# Patient Record
Sex: MALE | Race: WHITE | NOT HISPANIC OR LATINO | Employment: FULL TIME | URBAN - METROPOLITAN AREA
[De-identification: names, ages, dates, MRNs, and addresses within clinical notes are randomized per-mention and may not be internally consistent; named-entity substitution may affect disease eponyms.]

---

## 2017-01-03 ENCOUNTER — HOSPITAL ENCOUNTER (EMERGENCY)
Facility: CLINIC | Age: 42
Discharge: HOME | End: 2017-01-04
Attending: EMERGENCY MEDICINE | Admitting: EMERGENCY MEDICINE
Payer: COMMERCIAL

## 2017-01-03 ENCOUNTER — APPOINTMENT (OUTPATIENT)
Dept: CT IMAGING | Facility: CLINIC | Age: 42
End: 2017-01-03
Attending: EMERGENCY MEDICINE
Payer: COMMERCIAL

## 2017-01-03 DIAGNOSIS — R56.9 CONVULSIONS, UNSPECIFIED CONVULSION TYPE (H): ICD-10-CM

## 2017-01-03 LAB
BASOPHILS # BLD AUTO: 0 10E9/L (ref 0–0.2)
BASOPHILS NFR BLD AUTO: 0.4 %
DIFFERENTIAL METHOD BLD: NORMAL
EOSINOPHIL # BLD AUTO: 0.2 10E9/L (ref 0–0.7)
EOSINOPHIL NFR BLD AUTO: 2.5 %
ERYTHROCYTE [DISTWIDTH] IN BLOOD BY AUTOMATED COUNT: 13.6 % (ref 10–15)
HCT VFR BLD AUTO: 40.6 % (ref 40–53)
HGB BLD-MCNC: 14.1 G/DL (ref 13.3–17.7)
IMM GRANULOCYTES # BLD: 0 10E9/L (ref 0–0.4)
IMM GRANULOCYTES NFR BLD: 0.1 %
LYMPHOCYTES # BLD AUTO: 2.2 10E9/L (ref 0.8–5.3)
LYMPHOCYTES NFR BLD AUTO: 30.8 %
MCH RBC QN AUTO: 30.6 PG (ref 26.5–33)
MCHC RBC AUTO-ENTMCNC: 34.7 G/DL (ref 31.5–36.5)
MCV RBC AUTO: 88 FL (ref 78–100)
MONOCYTES # BLD AUTO: 0.5 10E9/L (ref 0–1.3)
MONOCYTES NFR BLD AUTO: 7.3 %
NEUTROPHILS # BLD AUTO: 4.2 10E9/L (ref 1.6–8.3)
NEUTROPHILS NFR BLD AUTO: 58.9 %
NRBC # BLD AUTO: 0 10*3/UL
NRBC BLD AUTO-RTO: 0 /100
PLATELET # BLD AUTO: 245 10E9/L (ref 150–450)
RBC # BLD AUTO: 4.61 10E12/L (ref 4.4–5.9)
WBC # BLD AUTO: 7.1 10E9/L (ref 4–11)

## 2017-01-03 PROCEDURE — 96374 THER/PROPH/DIAG INJ IV PUSH: CPT

## 2017-01-03 PROCEDURE — 99285 EMERGENCY DEPT VISIT HI MDM: CPT | Mod: 25

## 2017-01-03 PROCEDURE — 85025 COMPLETE CBC W/AUTO DIFF WBC: CPT | Performed by: EMERGENCY MEDICINE

## 2017-01-03 PROCEDURE — 70450 CT HEAD/BRAIN W/O DYE: CPT

## 2017-01-03 PROCEDURE — 80320 DRUG SCREEN QUANTALCOHOLS: CPT | Performed by: EMERGENCY MEDICINE

## 2017-01-03 PROCEDURE — 80053 COMPREHEN METABOLIC PANEL: CPT | Performed by: EMERGENCY MEDICINE

## 2017-01-03 RX ORDER — LIDOCAINE 40 MG/G
CREAM TOPICAL
Status: DISCONTINUED | OUTPATIENT
Start: 2017-01-03 | End: 2017-01-04 | Stop reason: HOSPADM

## 2017-01-03 RX ORDER — LORAZEPAM 2 MG/ML
2 INJECTION INTRAMUSCULAR EVERY 5 MIN PRN
Status: DISCONTINUED | OUTPATIENT
Start: 2017-01-03 | End: 2017-01-04 | Stop reason: HOSPADM

## 2017-01-03 NOTE — ED AVS SNAPSHOT
Emergency Department    [address] [facility] [address]    Phone:  [phone]    Fax:  [phone]                                       Melvin Priest   MRN: [id number]    Department:   Emergency Department   Date of Visit:  1/3/2017           Patient Information     Date Of Birth          1975        Your diagnoses for this visit were:     Convulsions, unspecified convulsion type (H)        You were seen by Bakari Finch MD.      Follow-up Information     Follow up with Lisa Ulloa MD. Call today.    Specialty:  Neurology    Why:  call today for office follow up today or tomorrow    Contact information:    [facility]  [address] [address] [phone]          Follow up with  Emergency Department.    Specialty:  EMERGENCY MEDICINE    Why:  If symptoms worsen    Contact information:    [address] [facility] [id number] [phone]        Discharge Instructions         Seizure: New Onset, Unknown Cause (Adult)  You have had a seizure today. A seizure happens when a burst of electrical activity occurs in the brain. A seizure can have many causes. Often it s not possible to figure out the exact cause of a seizure from 1 exam. You might need other tests. Having 1 seizure doesn t mean that you will continue to have seizures. But until doctors know the cause of your seizure, you should assume that another seizure is possible.  Home care  Follow these tips when caring for yourself at home. For this seizure:    Seizures aren t predictable. So avoid doing anything that might cause danger to you or other people if you have another seizure. Don t drive, ride a bike, or operate dangerous equipment.    Don t take a bath alone. Take a shower instead.    Don t swim alone until your health care provider says that you are no longer in danger of having another seizure.    Tell your close friends and relatives about your  "seizure. Teach them what to do for you if it happens again.    If medicine was prescribed to prevent seizures, take it exactly as directed. It does not work when taken \"as needed.\" Missing doses will increase the risk of having another seizure.  For future seizures, if you are alone:  If you feel a seizure coming on, lie down on a bed or on the floor with something soft under your head. Lie on your side, not on your back. This will keep you from falling. It will also let fluid drain out of your mouth and prevent choking. Be sure you are clear of any objects that might injure you during the seizure. Call 911 if there is time.  For future seizures, if someone is with you:  The person should help you get into a safe position and call 911. The person shouldn t try to force anything in your mouth once the seizure begins. This could harm your teeth or jaw.  After a seizure, you may be drowsy or confused. The person should stay with you until you are fully awake. The person shouldn t offer you anything to eat or drink during that time. Call 911 or go to the emergency department so that you can be looked at.  Follow-up care  Follow up with your health care provider. You may need other tests to help figure out what caused your seizure. These tests may include brain wave tests or brain scans. Keep a seizure calendar to record how often you have a seizure. If you are being started on anti-seizure medicine, make sure that you use additional pregnancy protection. Seizure medicine can affect how well birth control pills work, and you could become pregnant. Avoid alcohol until your doctor tells you it s OK.  Note: For the safety of yourself and others on the road, certain states require that the treating doctor tell the Public Health Department about any adult who is treated for a seizure and is at risk of more seizures. In this case, the Department of Motor Vehicles will be told. A restriction will be put on your  s license " until a doctor gives you medical clearance to drive again. Contact your treating doctor to find out if your state requires the reporting of patients with a seizures condition.  When to seek medical advice  Call your health care provider right away if any of these occur:    Another seizure    Fever over 100.4 F (38.0 C)    Unusual irritability, drowsiness, or confusion    Headache or neck pain that gets worse       3817-8706 [employer]. [address] [place] [address], [place] [address]. All rights reserved. This information is not intended as a substitute for professional medical care. Always follow your healthcare professional's instructions.          Epilepsy: Safety During a Seizure  Safety during a seizure  Let family and friends know what to expect and how to react when you have a seizure. This helps keep them calm and you safe. All seizures should be treated with care, but tonic-clonic seizures (seizures during which you lose consciousness) require more attention. Here are some pointers for loved ones.  What to know  Seizures typically last less than 3 minutes, but it will feel like it is longer. People recover safely from most seizures. During a tonic-clonic seizure, the person may appear to stop breathing or turn slightly blue. This may be scary for you, but try to stay calm. Afterward, the person may be tired, confused, and achy. He or she may need to sleep for several hours to fully recover.    What to do  During any seizure, stay with the person until it is over. Note the time when the seizure starts and ends. Don t try to stop the seizure. During a tonic-clonic seizure, also do the following:    Move hard or sharp objects out of the way.    Lay the person on a flat surface and turn them on their side.    Place a flat, soft object under their head.    Don t try to restrain the person. Both of you could get hurt.    Don t put anything in the person s mouth. He or she cannot swallow his or her tongue,  and you risk breaking his or her teeth or being bitten.    Don t give the person medicines during a seizure, unless you ve been trained by a healthcare provider.    Speak quietly to the person as he or she recovers.  .There is no need to call 911 if the patient has a well-established cause of the seizures (like epilepsy) and the seizure is very typical. If in doubt or the person's condition is unknown, call 911.  Call 911 if the seizure lasts longer than 5 minutes, there is no conscious interval between 2 seizures, or several seizures happen in a row. These events could represent status epilepticus, a medical emergency. Here are some other causes of seizures and situations that need immediate medical attention:     If the person has diabetes    If the person has any brain infections    If the person has heat exhaustion    If the person is pregnant     Poisoning is known or suspected    The person has low blood sugar    A seizure happens after or during a high fever    A head injury immediately after or within a few days after the injury happened    Multiple seizures happen in a short period of time    The person stops breathing    A seizure that happens in water    The person hit his or her head during a seizure and becomes difficult to arouse, is vomiting or complains of blurry vision    It is the first time a seizure happens    It is different than the typical seizures for that person    The person is difficult to arouse after the seizure    Alcohol or drug abuse     Alcohol or drug withdrawal        8088-5867 [employer]. [address] [place] [address]. All rights reserved. This information is not intended as a substitute for professional medical care. Always follow your healthcare professional's instructions.          24 Hour Appointment Hotline       To make an appointment at any [facility], call [phone] ([phone]). If you don't have a family doctor or clinic, we will  help you find one. [facility] are conveniently located to serve the needs of you and your family.             Review of your medicines      START taking        Dose / Directions Last dose taken    levETIRAcetam 500 MG tablet   Commonly known as:  KEPPRA   Dose:  500 mg   Quantity:  30 tablet        Take 1 tablet (500 mg) by mouth 2 times daily   Refills:  0                Prescriptions were sent or printed at these locations (1 Prescription)                   Other Prescriptions                Printed at Department/Unit printer (1 of 1)         levETIRAcetam (KEPPRA) 500 MG tablet                Procedures and tests performed during your visit     CBC with platelets differential    CT Head w/o Contrast    Comprehensive metabolic panel    Drug abuse screen 77 urine ([place],[facility])    Ethanol level    Peripheral IV: Standard    UA with Microscopic      Orders Needing Specimen Collection     None      Pending Results     No orders found for last 2 day(s).            Pending Culture Results     No orders found for last 2 day(s).       Test Results from your hospital stay           1/3/2017 11:45 PM - Interface, Loggly Results      Component Results     Component Value Ref Range & Units Status    WBC 7.1 4.0 - 11.0 10e9/L Final    RBC Count 4.61 4.4 - 5.9 10e12/L Final    Hemoglobin 14.1 13.3 - 17.7 g/dL Final    Hematocrit 40.6 40.0 - 53.0 % Final    MCV 88 78 - 100 fl Final    MCH 30.6 26.5 - 33.0 pg Final    MCHC 34.7 31.5 - 36.5 g/dL Final    RDW 13.6 10.0 - 15.0 % Final    Platelet Count 245 150 - 450 10e9/L Final    Diff Method Automated Method  Final    % Neutrophils 58.9 % Final    % Lymphocytes 30.8 % Final    % Monocytes 7.3 % Final    % Eosinophils 2.5 % Final    % Basophils 0.4 % Final    % Immature Granulocytes 0.1 % Final    Nucleated RBCs 0 0 /100 Final    Absolute Neutrophil 4.2 1.6 - 8.3 10e9/L Final    Absolute Lymphocytes 2.2 0.8 - 5.3 10e9/L Final    Absolute Monocytes 0.5 0.0 - 1.3 10e9/L Final     Absolute Eosinophils 0.2 0.0 - 0.7 10e9/L Final    Absolute Basophils 0.0 0.0 - 0.2 10e9/L Final    Abs Immature Granulocytes 0.0 0 - 0.4 10e9/L Final    Absolute Nucleated RBC 0.0  Final         1/4/2017 12:06 AM - Interface, Flexilab Results      Component Results     Component Value Ref Range & Units Status    Sodium 140 133 - 144 mmol/L Final    Potassium 4.0 3.4 - 5.3 mmol/L Final    Chloride 104 94 - 109 mmol/L Final    Carbon Dioxide 27 20 - 32 mmol/L Final    Anion Gap 9 3 - 14 mmol/L Final    Glucose 115 (H) 70 - 99 mg/dL Final    Urea Nitrogen 21 7 - 30 mg/dL Final    Creatinine 0.94 0.66 - 1.25 mg/dL Final    GFR Estimate 89 >60 mL/min/1.7m2 Final    Non  GFR Calc    GFR Estimate If Black >90   GFR Calc   >60 mL/min/1.7m2 Final    Calcium 9.0 8.5 - 10.1 mg/dL Final    Bilirubin Total 0.3 0.2 - 1.3 mg/dL Final    Albumin 4.1 3.4 - 5.0 g/dL Final    Protein Total 7.4 6.8 - 8.8 g/dL Final    Alkaline Phosphatase 61 40 - 150 U/L Final    ALT 43 0 - 70 U/L Final    AST 32 0 - 45 U/L Final         1/4/2017 12:04 AM - Interface, Flexilab Results      Component Results     Component Value Ref Range & Units Status    Ethanol g/dL <0.01 <0.01 g/dL Final         1/4/2017  2:10 AM - Interface, Flexilab Results      Component Results     Component Value Ref Range & Units Status    Color Urine Yellow  Final    Appearance Urine Clear  Final    Glucose Urine Negative NEG mg/dL Final    Bilirubin Urine Negative NEG Final    Ketones Urine 10 (A) NEG mg/dL Final    Specific Gravity Urine 1.020 1.003 - 1.035 Final    Blood Urine Negative NEG Final    pH Urine 6.0 5.0 - 7.0 pH Final    Protein Albumin Urine 30 (A) NEG mg/dL Final    Urobilinogen mg/dL Normal 0.0 - 2.0 mg/dL Final    Nitrite Urine Negative NEG Final    Leukocyte Esterase Urine Negative NEG Final    Source Midstream Urine  Final    WBC Urine 1 0 - 2 /HPF Final    RBC Urine <1 0 - 2 /HPF Final    Mucous Urine Present (A) NEG /LPF  Final    sperm Present (A) NEG /HPF Final    Hyaline Casts 11 (H) 0 - 2 /LPF Final         1/4/2017  2:24 AM - Interface, Flexilab Results      Component Results     Component Value Ref Range & Units Status    Amphetamine Qual Urine  NEG Final    Negative   Cutoff for a negative amphetamine is 500 ng/mL or less.      Barbiturates Qual Urine  NEG Final    Negative   Cutoff for a negative barbiturate is 200 ng/mL or less.      Benzodiazepine Qual Urine  NEG Final    Positive   Cutoff for a positive benzodiazepine is greater than 200 ng/mL. This is an   unconfirmed screening result to be used for medical purposes only.   (A)    Cannabinoids Qual Urine  NEG Final    Negative   Cutoff for a negative cannabinoid is 50 ng/mL or less.      Cocaine Qual Urine  NEG Final    Negative   Cutoff for a negative cocaine is 300 ng/mL or less.      Opiates Qualitative Urine  NEG Final    Negative   Cutoff for a negative opiate is 300 ng/mL or less.      PCP Qual Urine  NEG Final    Negative   Cutoff for a negative PCP is 25 ng/mL or less.           1/3/2017 11:54 PM - Interface, Radiant Ib      Narrative     CT HEAD W/O CONTRAST  1/3/2017 11:50 PM      HISTORY: Seizure.    TECHNIQUE: Routine noncontrast head CT. Radiation dose for this scan  was reduced using automated exposure control, adjustment of the mA  and/or kV according to patient size, or iterative reconstruction  technique.    COMPARISON: None.    FINDINGS: Brain volume is normal for age. No mass, mass effect or  intracranial hemorrhage. No evidence of acute infarct. Mild ethmoid  sinus disease.        Impression     IMPRESSION: No acute abnormality.    CATRACHO BENITO MD                Clinical Quality Measure: Blood Pressure Screening     Your blood pressure was checked while you were in the emergency department today. The last reading we obtained was  BP: 110/71 mmHg . Please read the guidelines below about what these numbers mean and what you should do about them.  If  "your systolic blood pressure (the top number) is less than 120 and your diastolic blood pressure (the bottom number) is less than 80, then your blood pressure is normal. There is nothing more that you need to do about it.  If your systolic blood pressure (the top number) is 120-139 or your diastolic blood pressure (the bottom number) is 80-89, your blood pressure may be higher than it should be. You should have your blood pressure rechecked within a year by a primary care provider.  If your systolic blood pressure (the top number) is 140 or greater or your diastolic blood pressure (the bottom number) is 90 or greater, you may have high blood pressure. High blood pressure is treatable, but if left untreated over time it can put you at risk for heart attack, stroke, or kidney failure. You should have your blood pressure rechecked by a primary care provider within the next 4 weeks.  If your provider in the emergency department today gave you specific instructions to follow-up with your doctor or provider even sooner than that, you should follow that instruction and not wait for up to 4 weeks for your follow-up visit.        Thank you for choosing [facility]       Thank you for choosing [facility] for your care. Our goal is always to provide you with excellent care. Hearing back from our patients is one way we can continue to improve our services. Please take a few minutes to complete the written survey that you may receive in the mail after you visit with us. Thank you!        "[employer] Information     [employer] lets you send messages to your doctor, view your test results, renew your prescriptions, schedule appointments and more. To sign up, go to [email or web address] . Click on \"Log in\" on the left side of the screen, which will take you to the Welcome page. Then click on \"Sign up Now\" on the right side of the page.     You will be asked to enter the access code listed below, as well as some personal information. Please " follow the directions to create your username and password.     Your access code is: [email or web address]  Expires: 2017  3:27 AM     Your access code will  in 90 days. If you need help or a new code, please call your [facility] clinic or [phone].        Care EveryWhere ID     This is your Care EveryWhere ID. This could be used by other organizations to access your [facility] medical records  [id number]        After Visit Summary       This is your record. Keep this with you and show to your community pharmacist(s) and doctor(s) at your next visit.

## 2017-01-03 NOTE — ED AVS SNAPSHOT
Emergency Department    [address] [place] [address]    Phone:  [phone]    Fax:  [phone]                                       Melvin Priest   MRN: [id number]    Department:   Emergency Department   Date of Visit:  1/3/2017           After Visit Summary Signature Page     I have received my discharge instructions, and my questions have been answered. I have discussed any challenges I see with this plan with the nurse or doctor.    ..........................................................................................................................................  Patient/Patient Representative Signature      ..........................................................................................................................................  Patient Representative Print Name and Relationship to Patient    ..................................................               ................................................  Date                                            Time    ..........................................................................................................................................  Reviewed by Signature/Title    ...................................................              ..............................................  Date                                                            Time

## 2017-01-04 ENCOUNTER — HOSPITAL ENCOUNTER (OUTPATIENT)
Facility: CLINIC | Age: 42
Setting detail: OBSERVATION
Discharge: HOME | End: 2017-01-05
Attending: EMERGENCY MEDICINE | Admitting: INTERNAL MEDICINE
Payer: COMMERCIAL

## 2017-01-04 VITALS
TEMPERATURE: 96.8 F | BODY MASS INDEX: 24.5 KG/M2 | RESPIRATION RATE: 18 BRPM | SYSTOLIC BLOOD PRESSURE: 135 MMHG | OXYGEN SATURATION: 96 % | WEIGHT: 175 LBS | DIASTOLIC BLOOD PRESSURE: 85 MMHG | HEIGHT: 71 IN | HEART RATE: 68 BPM

## 2017-01-04 VITALS
OXYGEN SATURATION: 93 % | WEIGHT: 175 LBS | SYSTOLIC BLOOD PRESSURE: 103 MMHG | DIASTOLIC BLOOD PRESSURE: 54 MMHG | TEMPERATURE: 97.9 F | HEIGHT: 71 IN | BODY MASS INDEX: 24.5 KG/M2 | RESPIRATION RATE: 10 BRPM

## 2017-01-04 DIAGNOSIS — T78.40XA ALLERGIC REACTION, INITIAL ENCOUNTER: ICD-10-CM

## 2017-01-04 DIAGNOSIS — R56.9 CONVULSIONS, UNSPECIFIED CONVULSION TYPE (H): Primary | ICD-10-CM

## 2017-01-04 DIAGNOSIS — T78.40XA ALLERGIC REACTION: ICD-10-CM

## 2017-01-04 LAB
ALBUMIN SERPL-MCNC: 4.1 G/DL (ref 3.4–5)
ALBUMIN UR-MCNC: 30 MG/DL
ALP SERPL-CCNC: 61 U/L (ref 40–150)
ALT SERPL W P-5'-P-CCNC: 43 U/L (ref 0–70)
AMPHETAMINES UR QL SCN: ABNORMAL
ANION GAP SERPL CALCULATED.3IONS-SCNC: 9 MMOL/L (ref 3–14)
APPEARANCE UR: CLEAR
AST SERPL W P-5'-P-CCNC: 32 U/L (ref 0–45)
BARBITURATES UR QL: ABNORMAL
BENZODIAZ UR QL: ABNORMAL
BILIRUB SERPL-MCNC: 0.3 MG/DL (ref 0.2–1.3)
BILIRUB UR QL STRIP: NEGATIVE
BUN SERPL-MCNC: 21 MG/DL (ref 7–30)
CALCIUM SERPL-MCNC: 9 MG/DL (ref 8.5–10.1)
CANNABINOIDS UR QL SCN: ABNORMAL
CHLORIDE SERPL-SCNC: 104 MMOL/L (ref 94–109)
CO2 SERPL-SCNC: 27 MMOL/L (ref 20–32)
COCAINE UR QL: ABNORMAL
COLOR UR AUTO: YELLOW
CREAT SERPL-MCNC: 0.94 MG/DL (ref 0.66–1.25)
ETHANOL SERPL-MCNC: <0.01 G/DL
GFR SERPL CREATININE-BSD FRML MDRD: 89 ML/MIN/1.7M2
GLUCOSE SERPL-MCNC: 115 MG/DL (ref 70–99)
GLUCOSE UR STRIP-MCNC: NEGATIVE MG/DL
HGB UR QL STRIP: NEGATIVE
HYALINE CASTS #/AREA URNS LPF: 11 /LPF (ref 0–2)
KETONES UR STRIP-MCNC: 10 MG/DL
LEUKOCYTE ESTERASE UR QL STRIP: NEGATIVE
MUCOUS THREADS #/AREA URNS LPF: PRESENT /LPF
NITRATE UR QL: NEGATIVE
OPIATES UR QL SCN: ABNORMAL
PCP UR QL SCN: ABNORMAL
PH UR STRIP: 6 PH (ref 5–7)
POTASSIUM SERPL-SCNC: 4 MMOL/L (ref 3.4–5.3)
PROT SERPL-MCNC: 7.4 G/DL (ref 6.8–8.8)
RBC #/AREA URNS AUTO: <1 /HPF (ref 0–2)
SODIUM SERPL-SCNC: 140 MMOL/L (ref 133–144)
SP GR UR STRIP: 1.02 (ref 1–1.03)
SPERM #/AREA URNS HPF: PRESENT /HPF
URN SPEC COLLECT METH UR: ABNORMAL
UROBILINOGEN UR STRIP-MCNC: NORMAL MG/DL (ref 0–2)
WBC #/AREA URNS AUTO: 1 /HPF (ref 0–2)

## 2017-01-04 PROCEDURE — 25000125 ZZHC RX 250

## 2017-01-04 PROCEDURE — 99284 EMERGENCY DEPT VISIT MOD MDM: CPT | Mod: 25

## 2017-01-04 PROCEDURE — 25000125 ZZHC RX 250: Performed by: EMERGENCY MEDICINE

## 2017-01-04 PROCEDURE — 81001 URINALYSIS AUTO W/SCOPE: CPT | Performed by: EMERGENCY MEDICINE

## 2017-01-04 PROCEDURE — 96374 THER/PROPH/DIAG INJ IV PUSH: CPT

## 2017-01-04 PROCEDURE — 96372 THER/PROPH/DIAG INJ SC/IM: CPT

## 2017-01-04 PROCEDURE — 93005 ELECTROCARDIOGRAM TRACING: CPT

## 2017-01-04 PROCEDURE — 96375 TX/PRO/DX INJ NEW DRUG ADDON: CPT

## 2017-01-04 PROCEDURE — S0028 INJECTION, FAMOTIDINE, 20 MG: HCPCS | Performed by: EMERGENCY MEDICINE

## 2017-01-04 PROCEDURE — 25000128 H RX IP 250 OP 636: Performed by: EMERGENCY MEDICINE

## 2017-01-04 PROCEDURE — 99285 EMERGENCY DEPT VISIT HI MDM: CPT | Mod: 25

## 2017-01-04 PROCEDURE — 80307 DRUG TEST PRSMV CHEM ANLYZR: CPT | Performed by: EMERGENCY MEDICINE

## 2017-01-04 RX ORDER — PREDNISONE 20 MG/1
TABLET ORAL
Qty: 6 TABLET | Refills: 0 | Status: SHIPPED | OUTPATIENT
Start: 2017-01-04 | End: 2021-12-04

## 2017-01-04 RX ORDER — LEVETIRACETAM 500 MG/1
500 TABLET ORAL 2 TIMES DAILY
Qty: 30 TABLET | Refills: 0 | Status: SHIPPED | OUTPATIENT
Start: 2017-01-04 | End: 2017-01-05

## 2017-01-04 RX ORDER — DIPHENHYDRAMINE HYDROCHLORIDE 50 MG/ML
50 INJECTION INTRAMUSCULAR; INTRAVENOUS ONCE
Status: COMPLETED | OUTPATIENT
Start: 2017-01-04 | End: 2017-01-04

## 2017-01-04 RX ORDER — METHYLPREDNISOLONE SODIUM SUCCINATE 125 MG/2ML
125 INJECTION, POWDER, LYOPHILIZED, FOR SOLUTION INTRAMUSCULAR; INTRAVENOUS ONCE
Status: COMPLETED | OUTPATIENT
Start: 2017-01-04 | End: 2017-01-04

## 2017-01-04 RX ADMIN — LEVETIRACETAM 1000 MG: 100 INJECTION, SOLUTION INTRAVENOUS at 03:22

## 2017-01-04 RX ADMIN — FAMOTIDINE 20 MG: 10 INJECTION, SOLUTION INTRAVENOUS at 17:50

## 2017-01-04 RX ADMIN — METHYLPREDNISOLONE SODIUM SUCCINATE 125 MG: 125 INJECTION, POWDER, FOR SOLUTION INTRAMUSCULAR; INTRAVENOUS at 17:38

## 2017-01-04 RX ADMIN — EPINEPHRINE 0.3 MG: 1 INJECTION, SOLUTION INTRAMUSCULAR; SUBCUTANEOUS at 17:33

## 2017-01-04 RX ADMIN — EPINEPHRINE 0.3 MG: 1 INJECTION, SOLUTION INTRAMUSCULAR; SUBCUTANEOUS at 23:06

## 2017-01-04 RX ADMIN — DIPHENHYDRAMINE HYDROCHLORIDE 50 MG: 50 INJECTION, SOLUTION INTRAMUSCULAR; INTRAVENOUS at 17:42

## 2017-01-04 ASSESSMENT — ENCOUNTER SYMPTOMS
HEADACHES: 0
TROUBLE SWALLOWING: 0
CONFUSION: 0
SPEECH DIFFICULTY: 1
COUGH: 0
SHORTNESS OF BREATH: 0
SEIZURES: 1
NAUSEA: 0
TREMORS: 1

## 2017-01-04 NOTE — DISCHARGE INSTRUCTIONS
"  Seizure: New Onset, Unknown Cause (Adult)  You have had a seizure today. A seizure happens when a burst of electrical activity occurs in the brain. A seizure can have many causes. Often it s not possible to figure out the exact cause of a seizure from 1 exam. You might need other tests. Having 1 seizure doesn t mean that you will continue to have seizures. But until doctors know the cause of your seizure, you should assume that another seizure is possible.  Home care  Follow these tips when caring for yourself at home. For this seizure:    Seizures aren t predictable. So avoid doing anything that might cause danger to you or other people if you have another seizure. Don t drive, ride a bike, or operate dangerous equipment.    Don t take a bath alone. Take a shower instead.    Don t swim alone until your health care provider says that you are no longer in danger of having another seizure.    Tell your close friends and relatives about your seizure. Teach them what to do for you if it happens again.    If medicine was prescribed to prevent seizures, take it exactly as directed. It does not work when taken \"as needed.\" Missing doses will increase the risk of having another seizure.  For future seizures, if you are alone:  If you feel a seizure coming on, lie down on a bed or on the floor with something soft under your head. Lie on your side, not on your back. This will keep you from falling. It will also let fluid drain out of your mouth and prevent choking. Be sure you are clear of any objects that might injure you during the seizure. Call 911 if there is time.  For future seizures, if someone is with you:  The person should help you get into a safe position and call 911. The person shouldn t try to force anything in your mouth once the seizure begins. This could harm your teeth or jaw.  After a seizure, you may be drowsy or confused. The person should stay with you until you are fully awake. The person shouldn t " offer you anything to eat or drink during that time. Call 911 or go to the emergency department so that you can be looked at.  Follow-up care  Follow up with your health care provider. You may need other tests to help figure out what caused your seizure. These tests may include brain wave tests or brain scans. Keep a seizure calendar to record how often you have a seizure. If you are being started on anti-seizure medicine, make sure that you use additional pregnancy protection. Seizure medicine can affect how well birth control pills work, and you could become pregnant. Avoid alcohol until your doctor tells you it s OK.  Note: For the safety of yourself and others on the road, certain states require that the treating doctor tell the Public Health Department about any adult who is treated for a seizure and is at risk of more seizures. In this case, the Department of Motor Vehicles will be told. A restriction will be put on your  s license until a doctor gives you medical clearance to drive again. Contact your treating doctor to find out if your state requires the reporting of patients with a seizures condition.  When to seek medical advice  Call your health care provider right away if any of these occur:    Another seizure    Fever over 100.4 F (38.0 C)    Unusual irritability, drowsiness, or confusion    Headache or neck pain that gets worse       4650-9122 [employer]. [address] [place] [address], [place] [id number]. All rights reserved. This information is not intended as a substitute for professional medical care. Always follow your healthcare professional's instructions.          Epilepsy: Safety During a Seizure  Safety during a seizure  Let family and friends know what to expect and how to react when you have a seizure. This helps keep them calm and you safe. All seizures should be treated with care, but tonic-clonic seizures (seizures during which you lose consciousness) require more attention.  Here are some pointers for loved ones.  What to know  Seizures typically last less than 3 minutes, but it will feel like it is longer. People recover safely from most seizures. During a tonic-clonic seizure, the person may appear to stop breathing or turn slightly blue. This may be scary for you, but try to stay calm. Afterward, the person may be tired, confused, and achy. He or she may need to sleep for several hours to fully recover.    What to do  During any seizure, stay with the person until it is over. Note the time when the seizure starts and ends. Don t try to stop the seizure. During a tonic-clonic seizure, also do the following:    Move hard or sharp objects out of the way.    Lay the person on a flat surface and turn them on their side.    Place a flat, soft object under their head.    Don t try to restrain the person. Both of you could get hurt.    Don t put anything in the person s mouth. He or she cannot swallow his or her tongue, and you risk breaking his or her teeth or being bitten.    Don t give the person medicines during a seizure, unless you ve been trained by a healthcare provider.    Speak quietly to the person as he or she recovers.  .There is no need to call 911 if the patient has a well-established cause of the seizures (like epilepsy) and the seizure is very typical. If in doubt or the person's condition is unknown, call 911.  Call 911 if the seizure lasts longer than 5 minutes, there is no conscious interval between 2 seizures, or several seizures happen in a row. These events could represent status epilepticus, a medical emergency. Here are some other causes of seizures and situations that need immediate medical attention:     If the person has diabetes    If the person has any brain infections    If the person has heat exhaustion    If the person is pregnant     Poisoning is known or suspected    The person has low blood sugar    A seizure happens after or during a high fever    A head  injury immediately after or within a few days after the injury happened    Multiple seizures happen in a short period of time    The person stops breathing    A seizure that happens in water    The person hit his or her head during a seizure and becomes difficult to arouse, is vomiting or complains of blurry vision    It is the first time a seizure happens    It is different than the typical seizures for that person    The person is difficult to arouse after the seizure    Alcohol or drug abuse     Alcohol or drug withdrawal        8426-0679 [employer]. [address] [place], [place] [id number]. All rights reserved. This information is not intended as a substitute for professional medical care. Always follow your healthcare professional's instructions.

## 2017-01-04 NOTE — ED AVS SNAPSHOT
MRN:[id number]                      After Visit Summary   1/4/2017    Melvin Priest    MRN: [id number]           Thank you!     Thank you for choosing [facility] for your care. Our goal is always to provide you with excellent care. Hearing back from our patients is one way we can continue to improve our services. Please take a few minutes to complete the written survey that you may receive in the mail after you visit with us. Thank you!        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                  Who to Call     For medical emergencies, please call 911.  For non-urgent questions about your medical care, please call your primary care provider or clinic, None          Attending Provider     Provider    Kosta Ospina MD Rumicho, Kuwe Edossa, MD       Primary Care Provider    None       No address on file        After Care Instructions     Activity       Your activity upon discharge: activity as tolerated and no driving until cleared by neurology            Diet       Follow this diet upon discharge: Regular                  Follow-up Appointments     Follow-up and recommended labs and tests        Follow up with primary care provider within 7 days for hospital follow- up.  Follow up with Dr. Garber of Neurology in 3-4 weeks.                  Pending Results     No orders found for last 2 day(s).            Statement of Approval     Ordered          01/05/17 1705  I have reviewed and agree with all the recommendations and orders detailed in this document.   EFFECTIVE NOW     Approved and electronically signed by:  Filipe Griffin,  "PA             Admission Information        Provider Department Dept Phone    2017 Edu Buchanan MD [facility] [phone]      Your Vitals Were     Blood Pressure Pulse Temperature    132/76 mmHg 71 97.2  F (36.2  C) (Oral)    Respirations Height Weight    16 1.803 m (5' 11\") 81.103 kg (178 lb 12.8 oz)    BMI (Body Mass Index) Pulse Oximetry       24.95 kg/m2 97%       MyChart Information     [employer] lets you send messages to your doctor, view your test results, renew your prescriptions, schedule appointments and more. To sign up, go to [email or web address] . Click on \"Log in\" on the left side of the screen, which will take you to the Welcome page. Then click on \"Sign up Now\" on the right side of the page.     You will be asked to enter the access code listed below, as well as some personal information. Please follow the directions to create your username and password.     Your access code is: [email or web address]  Expires: 2017  3:27 AM     Your access code will  in 90 days. If you need help or a new code, please call your [facility] clinic or [phone].        Care EveryWhere ID     This is your Care EveryWhere ID. This could be used by other organizations to access your [facility] medical records  [id number]           Review of your medicines      START taking        Dose / Directions    carBAMazepine 200 MG 12 hr tablet   Commonly known as:  TEGretol XR   Used for:  Convulsions, unspecified convulsion type (H)        Dose:  200 mg   Take 1 tablet (200 mg) by mouth 2 times daily   Quantity:  60 tablet   Refills:  1         CONTINUE these medicines which may have CHANGED, or have new prescriptions. If we are uncertain of the size of tablets/capsules you have at home, strength may be listed as something that might have changed.        Dose / Directions    predniSONE 20 MG tablet   Commonly known as:  DELTASONE   This may have changed:  additional instructions        Take two tablets (= 40mg) " each day for 3 (three) days   Quantity:  6 tablet   Refills:  0         STOP taking     levETIRAcetam 500 MG tablet   Commonly known as:  KEPPRA                Where to get your medicines      These medications were sent to [facility] - [address]  [address] [place] [address]     Phone:  [phone]    - carBAMazepine 200 MG 12 hr tablet             Protect others around you: Learn how to safely use, store and throw away your medicines at www.disposemymeds.org.             Medication List: This is a list of all your medications and when to take them. Check marks below indicate your daily home schedule. Keep this list as a reference.      Medications           Morning Afternoon Evening Bedtime As Needed    carBAMazepine 200 MG 12 hr tablet   Commonly known as:  TEGretol XR   Take 1 tablet (200 mg) by mouth 2 times daily                                predniSONE 20 MG tablet   Commonly known as:  DELTASONE   Take two tablets (= 40mg) each day for 3 (three) days   Last time this was given:  40 mg on 1/5/2017  8:37 AM

## 2017-01-04 NOTE — ED AVS SNAPSHOT
Emergency Department    [address] [place] [id number]    Phone:  [phone]    Fax:  [phone]                                       Melvin Priest   MRN: [id number]    Department:   Emergency Department   Date of Visit:  1/4/2017           After Visit Summary Signature Page     I have received my discharge instructions, and my questions have been answered. I have discussed any challenges I see with this plan with the nurse or doctor.    ..........................................................................................................................................  Patient/Patient Representative Signature      ..........................................................................................................................................  Patient Representative Print Name and Relationship to Patient    ..................................................               ................................................  Date                                            Time    ..........................................................................................................................................  Reviewed by Signature/Title    ...................................................              ..............................................  Date                                                            Time

## 2017-01-04 NOTE — ED PROVIDER NOTES
"  History     Chief Complaint:  Oral Swelling      HPI   Melvin Priest is a 41 year old male who presents with wife for evaluation of throat and tongue swelling and difficulty speaking secondary to tongue swelling in the setting of recently being started on Keppra last night for seizures with last dose at 4:30pm and gradual swelling over the past hour. He has had no difficulty swallowing, chest pain, cough, rashes, or itching. He has not taken any medication for his symptoms prior to coming into the ED. The patient has had no other exposures to new foods, soaps, detergents, fragrances, and he has no other known allergies.     Allergies:  No Known Allergies     Medications:    levETIRAcetam (KEPPRA) 500 MG tablet     Past Medical History:    ROBIN     Past Surgical History:    Kidney donation     Family History:    History reviewed. No past pertinent family history.     Social History:  Tobacco Use: Former 1.00 PPD for 8 years   Alcohol Use: Yes   Marital status:    Here in the ED with: Wife     Review of Systems   HENT: Negative for trouble swallowing.         + for: throat and tongue swelling   Respiratory: Negative for cough and shortness of breath.    Cardiovascular: Negative for chest pain.   Skin: Negative for rash.   Neurological: Positive for speech difficulty.   All other systems reviewed and are negative.      Physical Exam   First Vitals:  BP: 135/80 mmHg  Heart Rate: 65  Temp: 96.8  F (36  C)  Resp: 16  Height: 180.3 cm (5' 11\")  Weight: 79.379 kg (175 lb)  SpO2: 98 %      Patient Vitals for the past 24 hrs:   BP Temp Temp src Pulse Heart Rate Resp SpO2 Height Weight   01/04/17 2118 - - - 68 - 18 96 % - -   01/04/17 2030 135/85 mmHg - - - 68 19 95 % - -   01/04/17 2000 (!) 139/99 mmHg - - - 90 20 - - -   01/04/17 1930 138/89 mmHg - - - 69 - 97 % - -   01/04/17 1900 126/80 mmHg - - - 70 - - - -   01/04/17 1830 131/72 mmHg - - - 63 14 97 % - -   01/04/17 1815 128/64 mmHg - - - 67 - 97 % - - " "  01/04/17 1800 151/80 mmHg - - - 67 - - - -   01/04/17 1745 143/81 mmHg - - - 73 - 98 % - -   01/04/17 1742 145/62 mmHg - - - 67 - 96 % - -   01/04/17 1725 135/80 mmHg 96.8  F (36  C) Temporal - 65 16 98 % 1.803 m (5' 11\") 79.379 kg (175 lb)       Physical Exam  General: Appears well-developed and well-nourished.   Head: No signs of trauma.   Mouth/Throat: Enlargement of the tongue, but posterior oropharynx clear without clear edema.  Speech somewhat thick sounding.  Eyes: Conjunctivae are normal.   Neck: Normal range of motion. No nuchal rigidity. No cervical adenopathy  CV: Normal rate and regular rhythm.    Resp: Effort normal and breath sounds normal. No respiratory distress.   GI: Soft. There is no tenderness or guarding.  Normal bowel sounds.    MSK: Normal range of motion. no edema. No Calf tenderness.  Neuro: The patient is alert and oriented.  Normal ambulation.    GCS 15  Skin: Skin is warm and dry. No rash noted.   Psych: normal mood and affect. behavior is normal.       Emergency Department Course   Interventions:   1733 Epinephrine 0.3 IM  1738 Solu-Medrol 125 mg IV  1742 Benadryl 50 mg IV  1750 Pepcid 20 mg IV    Emergency Department Course:   The patient was placed on pulse oximetry  The patient was placed on continuous cardiac monitoring  1754 Recheck.   1840 Recheck.   2041 Recheck. I discussed the plan with the patient. After about 3.5 hours here in the ED, the patient has showed no worsening symptoms and significantly improved with the above interventions.   2102 I discussed the case with Dr. Mace of Neurology  Findings and plan explained to the Patient and family. Patient discharged home with instructions regarding supportive care, medications, and reasons to return. The importance of close follow-up was reviewed      Impression & Plan    Medical Decision Making:  Melvin Priest is a 41 year old male who presents due to tongue swelling and concern for allergic reaction. He was seen last " night for a seizure. He had been given a dose of Keppra in the ED and discharged home on Keppra which is a new medication for him. He had taken a dose this afternoon and developed tongue swelling which prompted him to come to the ED. On my evaluation, he did have somewhat thick speech and some tongue swelling, but he is not in any respiratory distress and his lung sounds were clear. He was given IM Epinephrine along with Solu-medrol, Benadryl, and H2 blocker. He was monitored for over three hours and had complete resolution of his symptoms. Given this, I felt he was appropriate for outpatient management. He was given a prescription for Prednisone and recommended to take it this evening before bed. He is also recommended to continue with Benadryl. He is given clear instructions to return to the ED if he developed any tongue swelling, or any new or worsening of his symptoms. I spoke with Dr. Awad who recommended holding off on starting a new medication. The patient had actually seen Dr. Ulloa today in clinic, so I recommended he call Dr. Ulloa tomorrow to discuss whether he should start a new medication or not.     Diagnosis:    ICD-10-CM    1. Allergic reaction, initial encounter T78.40XA        Disposition:  discharged to home with the prescriptions listed below.    Discharge Medications:  Discharge Medication List as of 1/4/2017  9:12 PM      START taking these medications    Details   predniSONE (DELTASONE) 20 MG tablet Take two tablets (= 40mg) each day for 3 (three) days, Disp-6 tablet, R-0, Local Print             I, Renato Medina, am serving as a Scribe on 1/4/2017 at 5:29 PM to personally document the services performed by Dr. Ospina based upon my observations and the provider's statements to me.    1/4/2017    EMERGENCY DEPARTMENT        Kosta Ospina MD  01/04/17 9157

## 2017-01-04 NOTE — ED AVS SNAPSHOT
Emergency Department    [address] [facility] [address]    Phone:  [phone]    Fax:  [phone]                                       Melvin Priest   MRN: [id number]    Department:   Emergency Department   Date of Visit:  1/4/2017           Patient Information     Date Of Birth          1975        Your diagnoses for this visit were:     Allergic reaction, initial encounter        You were seen by Kosta Ospina MD.      Follow-up Information     Follow up with Your Primary Care Doctor In 5 days.        Follow up with  Emergency Department.    Specialty:  EMERGENCY MEDICINE    Why:  As needed, If symptoms worsen    Contact information:    [address] [facility] [address] [phone]        Follow up with Lisa Ulloa MD. Call in 1 day.    Specialty:  Neurology    Why:  To discuss your allergic reaction to Keppra    Contact information:    [facility] [place] [facility]  [address] [facility] [address]  [facility] [address] [phone]          Discharge Instructions         Allergic Reaction,Generalized [Other]  You are having an allergic reaction. This may cause an itchy rash, dizziness, fainting, trouble breathing or swallowing, and swelling of the face or other parts of the body.  This can be caused by exposure to something in your surroundings that you have become sensitive to. This could be due to medicine or food. This could also be due to something you put on your skin or in your hair or something in the air. Often it is not possible to find out exactly what has caused your reaction.  The goal of today's treatment is to relieve symptoms. The rash will usually fade over several days, but can sometimes last up to two weeks.  Home Care:  1) If you know what you are allergic to, avoid it because future reactions could be worse than this one.  2) Avoid tight clothing and anything that heats up your skin (hot showers/baths, direct sunlight) since heat will  "make itching worse.  3) An ice pack will relieve local areas of intense itching and redness. Lanacaine cream or Solarcaine spray (or other product containing \"benzocaine\", available without a prescription) will reduce the itching.  4) Oral Benadryl (diphenhydramine) is an antihistamine available at drug and grocery stores. Unless a prescription antihistamine was given, Benadryl may be used to reduce itching if large areas of the skin are involved. Use lower doses during the daytime and higher doses at bedtime since the drug may make you sleepy. [NOTE: Do not use Benadryl if you have glaucoma or if you are a man with trouble urinating due to an enlarged prostate.] Claritin (loratidine) is an antihistamine that causes less drowsiness and is a good alternative for daytime use.  Follow Up  Follow Up with your doctor or this facility in two days if your symptoms do not continue to improve. If you had a severe reaction today, or if you have had several mild-moderate allergic reactions in the past, ask your doctor about allergy testing to find out what you are allergic to. If your reaction included dizziness, fainting or trouble breathing or swallowing, ask your doctor about carrying an Allergy Kit (injectable epinephrine) for home use.  Get Prompt Medical Attention if any of the following occur:  -- Trouble breathing or swallowing  -- New or worse swelling in the face, eyelids, lips, mouth, tongue or throat  -- Dizziness, weakness or fainting    [id number] [employer]. [address] [place] [address], [place] [address]. All rights reserved. This information is not intended as a substitute for professional medical care. Always follow your healthcare professional's instructions.          24 Hour Appointment Hotline       To make an appointment at any [facility], call [phone] ([phone]). If you don't have a family doctor or clinic, we will help you find one. [facility] are conveniently located to " serve the needs of you and your family.             Review of your medicines      START taking        Dose / Directions Last dose taken    predniSONE 20 MG tablet   Commonly known as:  DELTASONE   Quantity:  6 tablet        Take two tablets (= 40mg) each day for 3 (three) days   Refills:  0          Our records show that you are taking the medicines listed below. If these are incorrect, please call your family doctor or clinic.        Dose / Directions Last dose taken    levETIRAcetam 500 MG tablet   Commonly known as:  KEPPRA   Dose:  500 mg   Quantity:  30 tablet        Take 1 tablet (500 mg) by mouth 2 times daily   Refills:  0                Prescriptions were sent or printed at these locations (1 Prescription)                   Other Prescriptions                Printed at Department/Unit printer (1 of 1)         predniSONE (DELTASONE) 20 MG tablet                Orders Needing Specimen Collection     None      Pending Results     No orders found from 1/3/2017 to 1/5/2017.            Pending Culture Results     No orders found from 1/3/2017 to 1/5/2017.             Test Results from your hospital stay            Clinical Quality Measure: Blood Pressure Screening     Your blood pressure was checked while you were in the emergency department today. The last reading we obtained was  BP: 135/85 mmHg . Please read the guidelines below about what these numbers mean and what you should do about them.  If your systolic blood pressure (the top number) is less than 120 and your diastolic blood pressure (the bottom number) is less than 80, then your blood pressure is normal. There is nothing more that you need to do about it.  If your systolic blood pressure (the top number) is 120-139 or your diastolic blood pressure (the bottom number) is 80-89, your blood pressure may be higher than it should be. You should have your blood pressure rechecked within a year by a primary care provider.  If your systolic blood pressure  "(the top number) is 140 or greater or your diastolic blood pressure (the bottom number) is 90 or greater, you may have high blood pressure. High blood pressure is treatable, but if left untreated over time it can put you at risk for heart attack, stroke, or kidney failure. You should have your blood pressure rechecked by a primary care provider within the next 4 weeks.  If your provider in the emergency department today gave you specific instructions to follow-up with your doctor or provider even sooner than that, you should follow that instruction and not wait for up to 4 weeks for your follow-up visit.        Thank you for choosing [facility]       Thank you for choosing [facility] for your care. Our goal is always to provide you with excellent care. Hearing back from our patients is one way we can continue to improve our services. Please take a few minutes to complete the written survey that you may receive in the mail after you visit with us. Thank you!        [facility] Information     [employer] lets you send messages to your doctor, view your test results, renew your prescriptions, schedule appointments and more. To sign up, go to [email or web address] . Click on \"Log in\" on the left side of the screen, which will take you to the Welcome page. Then click on \"Sign up Now\" on the right side of the page.     You will be asked to enter the access code listed below, as well as some personal information. Please follow the directions to create your username and password.     Your access code is: [email or web address]  Expires: 2017  3:27 AM     Your access code will  in 90 days. If you need help or a new code, please call your [facility] clinic or [phone].        Care EveryWhere ID     This is your Care EveryWhere ID. This could be used by other organizations to access your [facility] medical records  [id number]        After Visit Summary       This is your record. Keep this with you and show to your community " pharmacist(s) and doctor(s) at your next visit.

## 2017-01-04 NOTE — ED AVS SNAPSHOT
[facility] Observation Unit    [address] [facility] [address]    Phone:  [phone]                                       Melvin Priest   MRN: [id number]    Department:  [facility]   Date of Visit:  1/5/2017           After Visit Summary Signature Page     I have received my discharge instructions, and my questions have been answered. I have discussed any challenges I see with this plan with the nurse or doctor.    ..........................................................................................................................................  Patient/Patient Representative Signature      ..........................................................................................................................................  Patient Representative Print Name and Relationship to Patient    ..................................................               ................................................  Date                                            Time    ..........................................................................................................................................  Reviewed by Signature/Title    ...................................................              ..............................................  Date                                                            Time

## 2017-01-04 NOTE — ED NOTES
Pt sitting up on edge of bed with urinal, but was not able urinate. Back laying in bed with siderails up.

## 2017-01-04 NOTE — ED NOTES
Bed: ED27  Expected date: 1/3/17  Expected time: 11:10 PM  Means of arrival: Ambulance  Comments:  Christine 416 Seizure sedated 41 male

## 2017-01-04 NOTE — ED PROVIDER NOTES
"  History     Chief Complaint:  Seizures      HPI   Melvin Priest is a 41 year old [facility] healthy male who presents via EMS for evaluation of a seizure. EMS report that the patient and his wife were sleeping when wife woke up to a  blood curling scream  coming from the patient with him shaking and convulsing for a few minutes. Wife called EMS who note that the patient was postictal, but they did not indicate any bladder or bowel incontinence. The patient became quite agitated in the ambulance and was handcuffs and ultimately received 5 mg Versed prior to arrival. EMS state the patient had a blood sugar of 136 and denies any illness within the last week, however he did start a 30 day diet yesterday. He denies nausea, vomiting, headache or confusion. The patient has no personal or family history of seizures and does not drink on a regular basis.     Allergies:  NKDA    Medications:    The patient is currently on no regular medications.       Past Medical History:    History reviewed.  No significant past medical history.     Past Surgical History:    Kidney donation      Family History:    History reviewed.  No significant family history.     Social History:  Marital Status:     Smoking status: Former smoker  Alcohol use: 1 drink/almost nightly      Review of Systems   Gastrointestinal: Negative for nausea.   Neurological: Positive for tremors and seizures. Negative for headaches.   Psychiatric/Behavioral: Negative for confusion.   All other systems reviewed and are negative.    Physical Exam   /78 mmHg  Temp(Src) 97.9  F (36.6  C) (Oral)  Resp 14  Ht 1.803 m (5' 11\")  Wt 79.379 kg (175 lb)  BMI 24.42 kg/m2  SpO2 95%         Physical Exam  Constitutional:  Appears well-developed and well-nourished. Cooperative. He is sleepy, but arousable. He answers questions appropriately and follows commands.  HENT:   Head:    Atraumatic.   Mouth/Throat:   Oropharynx is without erythema or exudate and mucous "     membranes are moist.   Eyes:    Conjunctivae normal and EOM are normal.      Pupils are equal, round, and reactive to light.   Neck:    Normal range of motion. Neck supple.   Cardiovascular:  Normal rate, regular rhythm, normal heart sounds and radial and    dorsalis pedis pulses are 2+ and symmetric.    Pulmonary/Chest:  Effort normal and breath sounds normal.   Abdominal:   Soft. Bowel sounds are normal.      No splenomegaly or hepatomegaly. No tenderness. No rebound.   Musculoskeletal:  Normal range of motion. No edema and no tenderness.   Neurological:  Alert. Normal strength. No cranial nerve deficit. Visual fields are grossly intact. Speech is fluent, but slow and mildly slurred. Normal upper and lower strength bilaterally.   Skin:    Skin is warm and dry.   Psychiatric:   Normal mood and affect.       Emergency Department Course     Imaging:  Radiographic findings were communicated with the patient who voiced understanding of the findings.    CT Head w/o Contrast  Final Result  IMPRESSION: No acute abnormality.    CATRACHO BENITO MD      Laboratory:  CBC: WBC 7.1 (WNL) HGB 14.1 (WNL)  (WNL)   CMP: Cr 0.94 (WNL) Glucose 115 (H)  Rest WNL  Drug abuse screen: Benzodiazepine Positive (A) Rest Negative  Blood alcohol: <0.01 (WNL)  UA: Clear, yellow urine; Urineketon 10 (A) Albumin 30 (A) Mucous Present (A) Hyaline casts 11 (H) Sperm Present (A) Rest WNL  Urine Culture: Pending      Interventions:  0322: Keppra, 1000 mg, IV    ED Course:  The patient arrived by ambulance. HE was escorted to the ED where his vitals were measured and recorded.     Nursing notes and past medical history reviewed.   I performed a physical examination of the patient as documented above.  I explained the plan with the patient who consents to this.   The above workups were undertaken.    The patient was placed on a cardiac monitor.  The patient was placed on oxygen per nasal canula.   0136: The patient and wife were updated.    0318: The patient was reevaluated.   I personally reviewed the laboratory and imaging results with the Patient and answered all related questions prior to discharge.   Findings and plan explained to the Patient. Patient discharged home with instructions regarding supportive care, medications, and reasons to return. The importance of close follow-up was reviewed.     Impression & Plan      Medical Decision Making:  This is a 41 year old male presenting after possible seizure.  ABCs were intact on presentation and there was no sign of trauma and no need for c-spine precautions based on history and presentation.  he has VS that are stable and appropriate and a physical exam that shows no acute abnormalities, of note there are no focal neurologic findings, but the patient is sedated and confused after receiving Versed and Haldol from EMS for post-ictal agitation.    DDX includes:  Epilepsy: No history of prior episodes, this will require further w/u with neurology to determine diagnosis.  Hypoglycemia: FSG .  No history of glycemic control issues.  Drugs:  No hx of recent ingestion, ETOH, drugs, or new medications, or unintentional ingestion.  Electrolyte abnormality: No gross abnormalities noted.    Infection:  No altered mental status now.  No recent history of confusion or altered mental status or confusion.  No signs or symptoms indicating CNS infection.  Space Occupying lesion: CT scan negative.    Trauma: No history of same.  Stroke/TIA:  History not consistent with this, no significant risk factors or findings consistent with stroke or TIA.    This is a first time seizure with complete recovery, after several hours of observation as the Haldol and Versed cleared. There are no abnormal findings on workup.  Neurology was consulted and agree that they can follow up for an EEG. He received 1000 mg Keppra here in the ED, because of the possible seizure a year ago when he kicked a wall while possibly sleep walking  The history, physical exam, and results detect no life threatening cause at this time, nor do they indicate the patient is currently suffering from one of the previously mentioned conditions.  Unfortunately a clear exam and results today do not ensure freedom from a severe disease process in the future-- even within hours, or the possibility that there is a dangerous process currently at work but currently undetected or undiagnosed, this was clearly conveyed to the patient/family.  For this reason the patient is advised to seek immediate re-evaluation in the the ED if there is a worsening of their condition such as headache, altered mental status, or another seizure, and to be seen by a more consistent care-giver, such as their PMD, if the symptoms persist more than one day.  These instructions were clearly stated and were repeated back to me by a competent patient who agreed to them.     Dr. Ulloa from neurology will see the patient in clinic likely today, and tomorrow at the latest. The patient will not drive or do other dangerous activities until cleared by neurology.      Diagnosis:    ICD-10-CM    1. Convulsions, unspecified convulsion type (H) R56.9          Disposition:   Discharge to home with neurology follow up.     Discharge Medications:     New Prescriptions    LEVETIRACETAM (KEPPRA) 500 MG TABLET    Take 1 tablet (500 mg) by mouth 2 times daily         IRich, am serving as a scribe on 1/3/2017 at 11:34 PM to personally document services performed by Bakari Finch MD, based on my observations and the provider's statements to me.          Bakari Finch MD  01/05/17 0646

## 2017-01-05 ENCOUNTER — APPOINTMENT (OUTPATIENT)
Dept: MRI IMAGING | Facility: CLINIC | Age: 42
End: 2017-01-05
Attending: PHYSICIAN ASSISTANT
Payer: COMMERCIAL

## 2017-01-05 VITALS
HEART RATE: 71 BPM | HEIGHT: 71 IN | SYSTOLIC BLOOD PRESSURE: 132 MMHG | WEIGHT: 178.8 LBS | TEMPERATURE: 97.2 F | OXYGEN SATURATION: 97 % | RESPIRATION RATE: 16 BRPM | BODY MASS INDEX: 25.03 KG/M2 | DIASTOLIC BLOOD PRESSURE: 76 MMHG

## 2017-01-05 PROBLEM — T78.40XA ALLERGIC REACTION: Status: ACTIVE | Noted: 2017-01-05

## 2017-01-05 PROCEDURE — 70553 MRI BRAIN STEM W/O & W/DYE: CPT

## 2017-01-05 PROCEDURE — 25000132 ZZH RX MED GY IP 250 OP 250 PS 637: Performed by: INTERNAL MEDICINE

## 2017-01-05 PROCEDURE — 95816 EEG AWAKE AND DROWSY: CPT

## 2017-01-05 PROCEDURE — G0378 HOSPITAL OBSERVATION PER HR: HCPCS

## 2017-01-05 PROCEDURE — 99235 HOSP IP/OBS SAME DATE MOD 70: CPT | Performed by: INTERNAL MEDICINE

## 2017-01-05 PROCEDURE — 25000125 ZZHC RX 250: Performed by: INTERNAL MEDICINE

## 2017-01-05 PROCEDURE — 40000061 ZZH STATISTIC EEG TIME EA 10 MIN

## 2017-01-05 PROCEDURE — 25500045 ZZH RX 255: Performed by: INTERNAL MEDICINE

## 2017-01-05 PROCEDURE — 25000132 ZZH RX MED GY IP 250 OP 250 PS 637: Performed by: PSYCHIATRY & NEUROLOGY

## 2017-01-05 PROCEDURE — A9585 GADOBUTROL INJECTION: HCPCS | Performed by: INTERNAL MEDICINE

## 2017-01-05 RX ORDER — CARBAMAZEPINE 200 MG/1
200 TABLET, EXTENDED RELEASE ORAL 2 TIMES DAILY
Status: DISCONTINUED | OUTPATIENT
Start: 2017-01-05 | End: 2017-01-05 | Stop reason: HOSPADM

## 2017-01-05 RX ORDER — PREDNISONE 20 MG/1
40 TABLET ORAL DAILY
Status: DISCONTINUED | OUTPATIENT
Start: 2017-01-05 | End: 2017-01-05 | Stop reason: HOSPADM

## 2017-01-05 RX ORDER — ONDANSETRON 2 MG/ML
4 INJECTION INTRAMUSCULAR; INTRAVENOUS EVERY 6 HOURS PRN
Status: DISCONTINUED | OUTPATIENT
Start: 2017-01-05 | End: 2017-01-05 | Stop reason: HOSPADM

## 2017-01-05 RX ORDER — ACETAMINOPHEN 325 MG/1
650 TABLET ORAL EVERY 4 HOURS PRN
Status: DISCONTINUED | OUTPATIENT
Start: 2017-01-05 | End: 2017-01-05 | Stop reason: HOSPADM

## 2017-01-05 RX ORDER — CETIRIZINE HYDROCHLORIDE 10 MG/1
10 TABLET ORAL DAILY
Status: DISCONTINUED | OUTPATIENT
Start: 2017-01-05 | End: 2017-01-05 | Stop reason: HOSPADM

## 2017-01-05 RX ORDER — PROCHLORPERAZINE MALEATE 5 MG
5-10 TABLET ORAL EVERY 6 HOURS PRN
Status: DISCONTINUED | OUTPATIENT
Start: 2017-01-05 | End: 2017-01-05 | Stop reason: HOSPADM

## 2017-01-05 RX ORDER — CARBAMAZEPINE 200 MG/1
200 TABLET, EXTENDED RELEASE ORAL 2 TIMES DAILY
Status: DISCONTINUED | OUTPATIENT
Start: 2017-01-05 | End: 2017-01-05

## 2017-01-05 RX ORDER — CARBAMAZEPINE 200 MG/1
200 TABLET, EXTENDED RELEASE ORAL 2 TIMES DAILY
Qty: 60 TABLET | Refills: 1 | Status: SHIPPED | OUTPATIENT
Start: 2017-01-05 | End: 2021-12-04

## 2017-01-05 RX ORDER — NALOXONE HYDROCHLORIDE 0.4 MG/ML
.1-.4 INJECTION, SOLUTION INTRAMUSCULAR; INTRAVENOUS; SUBCUTANEOUS
Status: DISCONTINUED | OUTPATIENT
Start: 2017-01-05 | End: 2017-01-05 | Stop reason: HOSPADM

## 2017-01-05 RX ORDER — ONDANSETRON 4 MG/1
4 TABLET, ORALLY DISINTEGRATING ORAL EVERY 6 HOURS PRN
Status: DISCONTINUED | OUTPATIENT
Start: 2017-01-05 | End: 2017-01-05 | Stop reason: HOSPADM

## 2017-01-05 RX ORDER — LIDOCAINE 40 MG/G
CREAM TOPICAL
Status: DISCONTINUED | OUTPATIENT
Start: 2017-01-05 | End: 2017-01-05 | Stop reason: HOSPADM

## 2017-01-05 RX ORDER — GADOBUTROL 604.72 MG/ML
8 INJECTION INTRAVENOUS ONCE
Status: COMPLETED | OUTPATIENT
Start: 2017-01-05 | End: 2017-01-05

## 2017-01-05 RX ORDER — DIPHENHYDRAMINE HYDROCHLORIDE 50 MG/ML
25 INJECTION INTRAMUSCULAR; INTRAVENOUS EVERY 6 HOURS PRN
Status: DISCONTINUED | OUTPATIENT
Start: 2017-01-05 | End: 2017-01-05 | Stop reason: HOSPADM

## 2017-01-05 RX ORDER — DIPHENHYDRAMINE HCL 25 MG
25 CAPSULE ORAL EVERY 6 HOURS PRN
Status: DISCONTINUED | OUTPATIENT
Start: 2017-01-05 | End: 2017-01-05 | Stop reason: HOSPADM

## 2017-01-05 RX ORDER — PROCHLORPERAZINE 25 MG
25 SUPPOSITORY, RECTAL RECTAL EVERY 12 HOURS PRN
Status: DISCONTINUED | OUTPATIENT
Start: 2017-01-05 | End: 2017-01-05 | Stop reason: HOSPADM

## 2017-01-05 RX ADMIN — PREDNISONE 40 MG: 20 TABLET ORAL at 08:37

## 2017-01-05 RX ADMIN — CARBAMAZEPINE 200 MG: 200 TABLET, EXTENDED RELEASE ORAL at 17:44

## 2017-01-05 RX ADMIN — GADOBUTROL 8 ML: 604.72 INJECTION INTRAVENOUS at 15:03

## 2017-01-05 RX ADMIN — CETIRIZINE HYDROCHLORIDE 10 MG: 10 TABLET, FILM COATED ORAL at 08:37

## 2017-01-05 ASSESSMENT — ENCOUNTER SYMPTOMS
SHORTNESS OF BREATH: 0
TROUBLE SWALLOWING: 0

## 2017-01-05 NOTE — PHARMACY-ADMISSION MEDICATION HISTORY
Admission medication history interview status for the 1/4/2017  admission is complete. See EPIC admission navigator for prior to admission medications     Medication history source reliability:Good    Actions taken by pharmacist (provider contacted, etc):None     Additional medication history information not noted on PTA med list :None    Medication reconciliation/reorder completed by provider prior to medication history? No    Time spent in this activity: 10 min    Prior to Admission medications    Medication Sig Last Dose Taking? Auth Provider   levETIRAcetam (KEPPRA) 500 MG tablet Take 1 tablet (500 mg) by mouth 2 times daily 1/4/2017 at 7 pm Yes Bakari Finch MD   predniSONE (DELTASONE) 20 MG tablet Take two tablets (= 40mg) each day for 3 (three) days  Patient taking differently: Take two tablets (= 40mg) each day for 3 (three) days.  Started on 1/4/17 1/4/2017 at 7 pm Yes Kosta Ospina MD

## 2017-01-05 NOTE — PROGRESS NOTES
Observation goals for discharge:    1. Stable Vitals: Met    2. Neurology evaluation: Not met; will be done today

## 2017-01-05 NOTE — ED NOTES
Pt arrives with complaint of tongue swelling approximately 30 minutes after taking his second dose of Keppra.

## 2017-01-05 NOTE — PLAN OF CARE
Problem: Goal Outcome Summary  Goal: Goal Outcome Summary  Outcome: Improving  Pt A&Ox4. VSS on room air. Up independently in room and halls throughout shift. No complaints of pain or SOB. Lung sounds clear. Tolerating regular diet. Adequate output. Tongue swelling resolved. Neurology consulted. EEG done with results in chart. Currently at MRI. Discharge plan pending MRI results. Nursing will continue to monitor.

## 2017-01-05 NOTE — PROGRESS NOTES
List all goals to be met before discharge home:  Stable vitals:Not met- pending overnight.   Neuro evaluation complete: Not met-will have in AM.     Nurse to notify MD when observation goals have been met and patient is ready for discharge.

## 2017-01-05 NOTE — ED NOTES
"[facility]  ED Nurse Handoff Report    ED Chief complaint: Allergic Reaction      ED Diagnosis:   Final diagnoses:   Allergic reaction       Code Status: Full Code    Allergies: No Known Allergies    Activity level:  Independent     Needed?: No    Isolation: No  Infection: Not Applicable    Bariatric?: No      Vital Signs:   Filed Vitals:    01/04/17 2248 01/04/17 2300 01/04/17 2330   BP: 145/73 137/80 132/81   Temp: 97.6  F (36.4  C)     TempSrc: Temporal     Resp: 18  18   Height: 1.803 m (5' 11\")     Weight: 79.379 kg (175 lb)     SpO2: 98%  96%       Cardiac Rhythm: ,        Pain level: 0-10 Pain Scale: 0    Is this patient confused?: No    Patient Report: Initial Complaint: allergic reaction  Focused Assessment: Pt was seen in the ED on 1/3 for seizure. Pt was given IV dose of Keppra and then sent home w/ script for po Keppra. Pt took dose today and noted to have oral swelling and came to the ED. Pt was given IM EPI, iv benadryl, solumedrol and pepcid. Pt improved and went home. Pt then developed swelling of tongue and returned to the ED. Pt was given IM dose of epi and noted to have improvement of swelling.    Tests Performed: NA  Abnormal Results: NA  Treatments provided: IM epi    Family Comments: family at bedside    OBS brochure/video discussed/provided to patient: Yes    ED Medications:   Medications   EPINEPHrine (ADRENALIN) injection 0.3 mg (0.3 mg Intramuscular Given 1/4/17 2306)       Drips infusing?:  No      ED NURSE PHONE NUMBER: *[phone]         "

## 2017-01-05 NOTE — DISCHARGE INSTRUCTIONS
"  Allergic Reaction,Generalized [Other]  You are having an allergic reaction. This may cause an itchy rash, dizziness, fainting, trouble breathing or swallowing, and swelling of the face or other parts of the body.  This can be caused by exposure to something in your surroundings that you have become sensitive to. This could be due to medicine or food. This could also be due to something you put on your skin or in your hair or something in the air. Often it is not possible to find out exactly what has caused your reaction.  The goal of today's treatment is to relieve symptoms. The rash will usually fade over several days, but can sometimes last up to two weeks.  Home Care:  1) If you know what you are allergic to, avoid it because future reactions could be worse than this one.  2) Avoid tight clothing and anything that heats up your skin (hot showers/baths, direct sunlight) since heat will make itching worse.  3) An ice pack will relieve local areas of intense itching and redness. Lanacaine cream or Solarcaine spray (or other product containing \"benzocaine\", available without a prescription) will reduce the itching.  4) Oral Benadryl (diphenhydramine) is an antihistamine available at drug and grocery stores. Unless a prescription antihistamine was given, Benadryl may be used to reduce itching if large areas of the skin are involved. Use lower doses during the daytime and higher doses at bedtime since the drug may make you sleepy. [NOTE: Do not use Benadryl if you have glaucoma or if you are a man with trouble urinating due to an enlarged prostate.] Claritin (loratidine) is an antihistamine that causes less drowsiness and is a good alternative for daytime use.  Follow Up  Follow Up with your doctor or this facility in two days if your symptoms do not continue to improve. If you had a severe reaction today, or if you have had several mild-moderate allergic reactions in the past, ask your doctor about allergy testing " to find out what you are allergic to. If your reaction included dizziness, fainting or trouble breathing or swallowing, ask your doctor about carrying an Allergy Kit (injectable epinephrine) for home use.  Get Prompt Medical Attention if any of the following occur:  -- Trouble breathing or swallowing  -- New or worse swelling in the face, eyelids, lips, mouth, tongue or throat  -- Dizziness, weakness or fainting    [id number] [employer]. [address] [place] [address]. All rights reserved. This information is not intended as a substitute for professional medical care. Always follow your healthcare professional's instructions.

## 2017-01-05 NOTE — H&P
PRIMARY CARE PHYSICIAN:  None listed.      CHIEF COMPLAINT:  Tongue swelling.      HISTORY OF PRESENT ILLNESS:  Melvin Marlow is a 41-year-old male who presented to the emergency room initially on the evening of 01/30/2017 due to a seizure episode.  At that time patient was monitored in the emergency room, had a workup and was discharged early morning on 01/04 with Keppra b.i.d. after Keppra load in the emergency room.  The patient reports yesterday he was seen by neurologist, Dr. Ulloa, regarding a seizure episode and he was on his way home.  The patient reports he took his evening dose of Keppra and developed tongue swelling.  As a result, he presented to the emergency room.  At that time, patient was given epinephrine injection of Solu-Medrol and Benadryl.  He was monitored in the emergency room and was discharged home with prednisone taper.  However, patient presented later in the evening due to tongue swelling and having slurring of his speech.  The patient reported that he was feeling better and normal when he left the ED earlier in the afternoon.  In the emergency room, he was given another dose of epinephrine.  The patient reports he started improving and slurring of his speech almost resolved.  Given that he had a delayed allergic reaction, admission under observation was requested for further management.  The patient otherwise denies any history of allergies.  Only new thing is initiation of Keppra yesterday.  He denies any chest pain, wheezing or stridor, nausea, vomiting, abdominal pain or headache.  He feels he is currently feeling much better.      In the ED, he was evaluated by Dr. Ospina.  His vital signs were stable and he did not require any supplemental oxygen.  His laboratory was also unremarkable including his basic metabolic panel, LFTs and CBCs the day prior.  He appeared comfortable breathing on room air during this visit.      PAST MEDICAL HISTORY:  Diagnosis of seizure on 01/04/2017  after presenting with a tonic-clonic movement and postictal state that was witnessed by his wife at home and started on Keppra on 01/04/2017.      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      SOCIAL HISTORY:  He denies tobacco use.  The patient endorses he drinks 1-2 he drinks a night.      FAMILY HISTORY:  He denies any history of seizures.      REVIEW OF SYSTEMS:  A 10-point review of systems was completed and is negative except as noted in the history of present illness.      ALLERGIES:  No known drug allergies.      MEDICATIONS:  Keppra and prednisone.      PHYSICAL EXAMINATION:   VITAL SIGNS:  Temperature 97.6, pulse 85, blood pressure 132/81, saturation 96% on room air.   GENERAL:  He is alert, awake, oriented, in no apparent distress.   HEENT:  Normocephalic, atraumatic.  Oral mucosa is moist.  No pharyngeal erythema or exudates.  He has left lateral border of he has a tongue bite.   NECK:  Supple.  No cervical lymphadenopathy.   CARDIOVASCULAR:  Regular rate and rhythm, no audible murmurs.   LUNGS:  Clear to auscultation bilaterally without wheezing or rhonchi.  No stridor.   ABDOMEN:  Soft, nontender, nondistended, active bowel sounds.   EXTREMITIES:  No lower extremity edema.      ASSESSMENT AND PLAN:  The patient is a 41-year-old male with a history of recently diagnosed seizure, started on Keppra, who is presenting due to tongue swelling and allergy reaction following Keppra dose.  He is currently being admitted under observation due to his second presentation on the same day due to delayed allergic reaction response.      Allergic reaction.  Suspect this is likely secondary to Keppra.  The patient's only new medication added Keppra.  He is currently breathing on room air without signs of any stridor.  He overall feels better.  We will admit him under observation.  Already status post IV steroids yesterday.  Thus, will start him on oral steroids in the morning, prednisone  40 mg daily.  Also ordered Zyrtec for him.  Benadryl p.r.n. will also be available.  The patient will be monitored with continuous pulse oximetry and telemetry in the observation unit.      Recent diagnosis of seizure.  He was diagnosed with a seizure yesterday and started on Keppra.  Since we are stopping his Keppra due to allergic reaction, will consult Neurology regarding alterantive antiepileptic medications.      CODE STATUS:  Full code.  Deep venous thrombosis prophylaxis.  This will be ambulation.      DISPOSITION:  Anticipate discharge in the morning if he remains stable and once neurology evaluation is complete.         JOSE EDUARDO OLEA MD             D: 2017 05:36   T: 2017 06:15   MT: [id number]      Name:     SAULO LYLES   MRN:      [id number]        Account:      [id number]   :      1975           Admitted:     [id number]      Document: [id number]

## 2017-01-05 NOTE — PROVIDER NOTIFICATION
Prescriber Notification Note    The pharmacist has communicated with this patient's provider regarding a concern or therapy recommendation.    Notified Person: Dr Garber  Date/Time of Notification: 1/5/17 1700  Interaction: phone  Concern/Recommendation:confirmed that she wants carbamazepine given now.

## 2017-01-05 NOTE — ED PROVIDER NOTES
"  History     Chief Complaint:  Allergic Reaction      HPI   Melvin Priest is a 41 year old male who presents to the ED within just under two hours of being discharged for the same tongue swelling that he was experiencing when he first came into the ED. The patient reports that he felt normal and at baseline at the time of discharge, but since being home, his tongue swelling has gradually increased. He is having no dyspnea, shortness of breath, or chest pain. No rashes noted. No difficulty swallowing.     Allergies:  No Known Allergies     Medications:    levETIRAcetam (KEPPRA) 500 MG tablet   predniSONE (DELTASONE) 20 MG tablet     Past Medical History:    ROBIN     Past Surgical History:    Kidney donation     Family History:    History reviewed. No past pertinent family history.     Social History:  Tobacco Use: Former smoker 1.00 PPD for 8 years   Alcohol Use: Yes   Marital status:      Review of Systems   HENT: Negative for trouble swallowing.         + for: tongue swelling   Respiratory: Negative for shortness of breath.    Cardiovascular: Negative for chest pain.   Skin: Negative for rash.       Physical Exam   First Vitals:  BP: 145/73 mmHg  Heart Rate: 77  Temp: 97.6  F (36.4  C)  Resp: 18  Height: 180.3 cm (5' 11\")  Weight: 79.379 kg (175 lb)  SpO2: 98 %      Patient Vitals for the past 24 hrs:   BP Temp Temp src Heart Rate Resp SpO2 Height Weight   01/05/17 0000 123/69 mmHg - - - - 95 % - -   01/04/17 2330 132/81 mmHg - - 85 18 96 % - -   01/04/17 2300 137/80 mmHg - - - - - - -   01/04/17 2248 145/73 mmHg 97.6  F (36.4  C) Temporal 77 18 98 % 1.803 m (5' 11\") 79.379 kg (175 lb)       Physical Exam  General: Appears well-developed and well-nourished.   Head: No signs of trauma.   Mouth/Throat: Minor enlargement of posterior tongue. No other oral swelling noted.  Somewhat thickened speech present.    Eyes: Conjunctivae are normal.   Neck: Normal range of motion. No nuchal rigidity. No cervical " adenopathy  CV: Normal rate and regular rhythm.    Resp: Effort normal and breath sounds normal. No respiratory distress.   GI: Soft. There is no tenderness or guarding.  Normal bowel sounds.    MSK: Normal range of motion. no edema. No Calf tenderness.  Neuro: The patient is alert and oriented.  Normal ambulation.  Skin: Skin is warm and dry. No rash noted.   Psych: normal mood and affect. behavior is normal.       Emergency Department Course   Interventions:   2306 Epinephrine 0.3 mg IM     Emergency Department Course:   Nursing notes and vitals reviewed.  I performed an exam of the patient as documented above.    9042 I discussed the case with Dr. Buchanan of the hospitalist service.  Findings and plan explained to the patient who consents to observation. Discussed the patient with ?Dr. Buchanan, who will place the patient in observation in the observation  area.       Impression & Plan    Medical Decision Making:  Melvin Priest is a 41 year old male who presents due to tongue swelling. He had actually been seen earlier today for tongue swelling which I believe is secondary to Keppra as he is not aware of any other new exposures. When he had a return of tongue swelling, he returned to the ED. On my evaluation, he did have some mild swelling to the posterior tongue. No other oral swelling was noted. He was given IM Epinephrine with resolution of his symptoms. He had a already taken the oral Benadryl and Prednisone prior to arrival, so I did not feel that he needed additional antihistamine or steroids. Given this is a return visit, he will be admitted for observation and monitoring of relapses.  He remained stable through his time in the ER.      Diagnosis:    ICD-10-CM    1. Allergic reaction T78.40XA        Disposition:  Admitted to the hospital as noted above.      IRenato, am serving as a Scribe on 1/4/2017 at 10:54 PM to personally document the services performed by Kosta Ospina MD,  based upon my observations and the provider's statements to me.    1/4/2017    EMERGENCY DEPARTMENT        Kosta Ospina MD  01/05/17 0045

## 2017-01-05 NOTE — PLAN OF CARE
Problem: Patient Care Overview (Adult)  Goal: Goal Outcome Summary  Seen in ED on 1/3 for seizure. Started on PO Keppra and now back with allergic reaction w/ swollen tongue. Pt received IM Epi in ED. Sent home and came back hours later. Another dose of IM Epi given.  Very slight swelling noted upon arrival to unit. Pt states that swelling feels minimal and has no difficulty swallowing or breathing. Pt is A&Ox4, SBA. VSS. Neurology will see pt this AM.

## 2017-01-05 NOTE — CONSULTS
[facility] [place]  [facility]    Neurology Consultation Note    Melvin Priest MRN# [id number]   YOB: 1975 Age: 41 year old    Code Status:Full Code   Date of Admission: 1/4/2017  Date of Consult:01/05/2017                                                                                       Assessment and Plan:                                         #. New Onset seizure  --preliminary evaluation of EEG suggest left temporal independent sharp wave  Suggestive of underlying epileptic potential, full EEG needs to be reviewed further.    Agree with plan to arrange for MRI scan of the brain    #. Angioedema,suspected as being a reaction to levetiracetam as there was no other new exposure.  --given this rather serious allergic reaction,  We would be reluctant to continue the levetiracetam.  I would recommend a different anticonvulsant, carbamazepine.      I will plan on returning later after the MRI and EEG results are further reviewed to determine final recommendation.    Patient reports that he does not plan on following up with Dr. Ulloa.  He reports that he was not satisfied with the amount of time that he received for his evaluation  Yesterday.     Later:    Reviewed EEG:  3 isolated independent left temp sharp waveforms  Mri brain: nl.      Discussed with patient and wife at length.    Discussed diagnosis of epilepsy.  Explained this is a chronic condition that will need regular follow up\  Recommended start of carbamazepine.  Discussed possible SE including rash, dizziness and drowsiness.    Will plan follow up in 3 weeks in my office  Discussed safety concerns regarding seizures.    No driving for 3 months.  Discussed need to report to DPS.    Time spent  12:00-13:05, initial  Follow up review 16:00-17:10  Total time spent  Face to face related to coordination of care  135minutes.        ----------------------------------------------------------------------------------  ----------------------------------------------------------------------------------  Reason for consult: I was asked by Dr. Buchanan to evaluate this patient for seizure medication managment.       Chief Complaint:   Swollen tongue, convulsion  History is obtained from the patient / chart       History of Present Illness:   This patient is a 41 year old male who presents with swelling of tongue and lips after starting levetiracetam yesterday.  Chart records are reviewed.  He presented to the hospital on the evening of January 3, 2017 after a witnessed convulsion.  He was advised to start levetiracetam and received his first dose.   Yesterday he was seen by Dr. Ulloa  And a workup of his seizure disorder was recommended.  At that time he was starting to notice some swelling of the tongue and later that day he went to the emergency department.  He received some treatment but then after his second dose of levetiracetam noted that the swelling of the tongue increased further and return to the emergency department.  He has noted gradual reduction in the swelling of the tongue although he feels it might be slightly swollen still.  He does believe he might have bitten his tongue with a seizure.  He did have an episode about two months ago in which he hit and kick the wall with damage which is suspected as possibly being a seizure         Past Medical History:     Past Medical History   Diagnosis Date     NO ACTIVE PROBLEMS          Past Surgical History:     Past Surgical History   Procedure Laterality Date     Kidney donation            Social History:     Social History     Social History     Marital Status:      Spouse Name: N/A     Number of Children: N/A     Years of Education: N/A     Social History Main Topics     Smoking status: Former Smoker -- 1.00 packs/day for 8 years     Types: Cigarettes     Quit date:  07/01/2005     Smokeless tobacco: None     Alcohol Use: 0.0 oz/week     0 Standard drinks or equivalent per week      Comment: 1 drink almost nightly     Drug Use: No     Sexual Activity: Not Asked     Other Topics Concern     None     Social History Narrative     Patient denies smoking, no significant alcohol intake, denies illicit drugs use Works in IT      Family History:     Family History   Problem Relation Age of Onset     Coronary Artery Disease No family hx of      CEREBROVASCULAR DISEASE No family hx of      DIABETES Maternal Grandfather      Hypertension No family hx of      Reviewed and not felt to be contributory. No family history of seizures        Home Medications:     Prior to Admission Medications   Prescriptions Last Dose Informant Patient Reported? Taking?   levETIRAcetam (KEPPRA) 500 MG tablet 1/4/2017 at 7 pm  No Yes   Sig: Take 1 tablet (500 mg) by mouth 2 times daily   predniSONE (DELTASONE) 20 MG tablet 1/4/2017 at 7 pm  No Yes   Sig: Take two tablets (= 40mg) each day for 3 (three) days   Patient taking differently: Take two tablets (= 40mg) each day for 3 (three) days.  Started on 1/4/17      Facility-Administered Medications: None          Allergy:     Allergies   Allergen Reactions     Keppra [Levetiracetam] Swelling          Inpatient Medications:   Scheduled Meds:    sodium chloride (PF)  3 mL Intracatheter Q8H     predniSONE  40 mg Oral Daily     cetirizine  10 mg Oral Daily     PRN Meds: naloxone, lidocaine, lidocaine 4%, sodium chloride (PF), acetaminophen, ondansetron **OR** ondansetron, prochlorperazine **OR** prochlorperazine **OR** prochlorperazine, diphenhydrAMINE **OR** diphenhydrAMINE        Review of Systems     A comprehensive review of  10 systems was performed and found to be negative except as described in this note  CONSTITUTIONAL: negative for fever, chills, change in weight  INTEGUMENTARY/SKIN: no rash or obvious new lesions  ENT/MOUTH: no sore throat, new sinus pain  "or nasal drainage, no neck mass noted  RESP: No pleuretic pain, No cough, no hemoptysis, No SOB   CV: negative for chest pain, palpitations or peripheral edema  GI: no nausea, vomiting, change in stools  : no dysuria or hematuria  MUSCULOSKELETAL: no myalgias, arthralgias or join efffusion  ENDOCRINE: no history of polyuria, polydyspsia or symptoms of thyroid dysfunction  PSYCHIATRIC: no change in mood stable  LYMPHATIC: no new lymphadenopathy  HEME: no bleeding or easy bruisability  NEURO: see HPI       Physical Exam:   Physical Exam  Vitals:  Height:5' 11\"  Weight:178 lbs 12.8 oz   Temp: 97.6  F (36.4  C) Temp src: Oral BP: 112/68 mmHg Pulse: 71 Heart Rate: 85 Resp: 16 SpO2: 97 % O2 Device: None (Room air)    General Appearance:  No acute distress  Neuro:       Mental Status Exam:    aalert and oriented.  Language and speech intact       Cranial Nerves:   Two through 12 intact.  Fundus technically difficult           Motor:   Tone bulk and strength intact ×4           Reflexes:  Symmetrically intact.  Plantar signs normal       Sensory:  Intact ×4                   Coordination:   Intact ×4       Gait:  Unable to be tested as he is receiving an EEG   Neck: no nuchal rigidity, normal thyroid. No carotid bruits.    Cardiovascular: Regular rate and rhythm, no m/r/g  Extremities: No clubbing, no cyanosis, no edema       Data:   ROUTINE IP LABS   CBC RESULTS:     Recent Labs  Lab 01/03/17  2336   WBC 7.1   RBC 4.61   HGB 14.1   HCT 40.6        Basic Metabolic Panel:   Recent Labs   Lab Test  01/03/17   2336   NA  140   POTASSIUM  4.0   CHLORIDE  104   CO2  27   BUN  21   CR  0.94   GLC  115*   DMITRY  9.0     Liver panel:  Recent Labs   Lab Test  01/03/17   2336   PROTTOTAL  7.4   ALBUMIN  4.1   BILITOTAL  0.3   ALKPHOS  61   AST  32   ALT  43     INR:No lab results found.   Lipid Profile:No lab results found.  Thyroid Panel:No lab results found.   Vitamin B12: No lab results found.   Vitamin D level: No lab " results found.  A1C: No lab results found.  Troponin I: No lab results found.  CRP inflammation: No lab results found.  ESR: No lab results found.    OTIS: No lab results found.    ANCA: No lab results found.      IMAGING:   All imaging studies were reviewed personally  CT head:   No acute abnormality    MRI brain:   --pending

## 2017-01-06 NOTE — PLAN OF CARE
Problem: Goal Outcome Summary  Goal: Goal Outcome Summary  Outcome: Adequate for Discharge Date Met:  01/05/17  VSS. Denies pain. Up independently. Tolerating regular diet. No swelling present. MRI complete. Seen by neuro, will start tegretol xr tonight and d/c home.

## 2017-01-06 NOTE — DISCHARGE SUMMARY
Patient has been discharged to home January 5, 2017 6:15 PM.  Home medication regimen and new medications discussed with patient and patient verbalizes understanding. Discharge medications were filled and sent with pt. Pt educated on medications. Diet and activity discussed with patient and patient verbalizes understanding. Upcoming appointments also discussed. Patient had no questions.

## 2017-01-06 NOTE — PROCEDURES
ELECTROENCEPHALOGRAM      ORDERING PHYSICIAN:  Dr. Garber      EEG#       DATE OF SERVICE:  2017.       CLINICAL HISTORY:  Saulo Marlow was witnessed to have a seizure on 2017 by his wife.  He was started on Keppra.  He returned to the hospital because of swelling of the tongue.  This was considered an allergic reaction.  His workup was ordered while he was in the observation unit.      CLINICAL FINDINGS:  The awake EEG background contained 9 Hz alpha activity which was most prominent over the posterior head region and attenuated with eye opening.  Lower amplitude beta activity was present over the anterior head regions.  On rare occasions independent sharp wave was observed in the left temporal region.  This occurred at time 12:26,  12:30 and 12:58 associated with hyperventilation.  Photic stimulation elicited no other abnormalities.  An EKG monitor at the time of the recording revealed sinus bradycardia at approximately 58 beats per minute.  No other lateralizing abnormalities were noted during the recording.      IMPRESSION:  This EEG reveals rare independent sharp waves localized in the left temporal lobe, which is suspicious for epileptic focus.  Clinical correlation is advised.  It is also noted that the patient was in a partially sleep deprived state for this study.         IRENE GARBER MD             D: 2017 16:14   T: 2017 08:56   MT: [id number]      Name:     SAULO LYLES   MRN:      [id number]        Account:        [id number]   :      1975           Procedure Date: 2017      Document: [id number]

## 2017-01-06 NOTE — DISCHARGE SUMMARY
DATE OF ADMISSION:  2017.      DATE OF SERVICE:  2017.      PRIMARY CARE PHYSICIAN:  None on file.       DISCHARGE DIAGNOSES:   1.  Allergic reaction, angioedema, related to Keppra.   2.  Seizure disorder.      DISCHARGE MEDICATIONS:   1.  Carbamazepine extended release 200 mg b.i.d. (new medication).   2.  Prednisone 40 mg daily for 3 days (new medication).      Stop taking these previous medications:  Keppra.      ALLERGIES:  Keppra.      DISPOSITION:  The patient was discharged home in stable condition.      FOLLOWUP INSTRUCTIONS:  The patient will follow up with Dr. Garber of Neurology in 3-4 weeks.  He should follow up with his primary care provider within 7 days for a post-hospitalization checkup.      The patient is not to drive for 3 months or until cleared by Neurology.      CONSULTANTS:  Dr. Nell Garber of Neurology.      PENDING TEST RESULTS:  None.      PROCEDURES AND IMPORTANT IMAGIN.  EEG:  The awake EEG background contained 9 Hz alpha activity which was most prominent over the posterior head region and attenuated with eye opening.  Lower amplitude beta activity was present over the anterior head regions.  On rare occasions independent sharp wave was observed in the left temporal region.  This occurred at time 12:26, 12:30 and 12:58 associated with hyperventilation.  Photic stimulation elicited no other abnormalities.  An EKG monitor at the time recording revealed sinus bradycardia at approximately 58 beats per minute.  No other lateralizing abnormalities were noted during the recording.   IMPRESSION:  Is that EEG reveals rare independent sharp waves localized in the left temporal lobe, which is suspicious for epileptic focus.  Clinical correlation is advised.  It was also noted that the patient was in a partially sleep deprived state for this study.  Reading per Neurology.   2.  Brain MRI with and without contrast:  The ventricles and basal cisterns are within normal limits and  configuration.  There is no midline shift.  There are no extraaxial fluid collections.  There is no evidence for acute stroke or for acute intracranial hemorrhage.  Gray white differentiation is well maintained.  Normal brain MRI per Radiology.   3.  CT head without contrast:  Brain volume is normal for age.  No mass effect, masses, intracranial hemorrhage seen.  No evidence for acute infarct.  Mild ethmoid sinus disease.  No acute abnormality per Radiology.      BRIEF HISTORY OF PRESENT ILLNESS:  Melvin Marlow is a pleasant 41-year-old male who initially presented to the Emergency Department on the evening of 01/03/2017 due to a seizure episode.  At that time the patient was monitored in the Emergency Department, had a noncontrast head CT that was negative, and was ultimately discharged home around the early morning of 01/04 with a prescription for Keppra b.i.d., after Keppra was loaded in the emergency room.  The patient reports that he was seen closely and followed up with the neurologist, Dr. Ulloa, regarding the seizure episode and was on his way home.  The patient reports he took his evening dose of Keppra and then developed tongue swelling.  As a result, he presented to the ER.  At that time, the patient was given epinephrine injection, Solu-Medrol and Benadryl.  He was monitored in the ER and was discharged home with a prednisone taper.  The patient again re-presented to the Emergency Department later in the evening due to tongue swelling and had slurring of his speech.  The patient reported that he was feeling better and normal when he left the ER earlier, but his symptoms did recur.  He was given another dose of epinephrine and started to see some improvement.  Given that he had a delayed allergic reaction, admission for observation was requested for further management.  The only new thing in his routine was the initiation of Keppra, which makes this the likely culprit.  He was otherwise hemodynamically  stable and breathing fine.      HOSPITAL COURSE:   1.  Angioedema/allergic reaction:  This is likely secondary to Keppra.  This is the only new medication.  He denies any other new exposures to substances, foods, etc.  He was breathing comfortably on room air without any signs of airway compromise or stridor.  Overall, he feels better.  He already completed 2 doses of epinephrine earlier in the day along with IV steroids and Benadryl.  We will continue with his short prednisone burst 40 mg daily for 3 days.  Keppra has been added to his allergy list.   2.  Seizure, new diagnosis:  The patient was diagnosed with a seizure on 01/03/2017 and was seen in the Emergency Department for this.  As above, he was started on Keppra, but this was discontinued secondary to allergic reaction.  As he was here in the hospital, we decided to continue with a seizure workup, which included an EEG and brain MRI.  His brain MRI was negative for any abnormalities.  His EEG did show rare independent sharp waves localized in left temporal lobe, which was suspicious for epileptic focus.  He was seen by Dr. Garber of Neurology.  She recommended starting a different anticonvulsant, carbamazepine, which was ordered on discharge.  The patient was recommended to followup closely in 3-4 weeks with Neurology.  He will need ongoing Neurology care.  He was instructed not to drive for 3 months.  He will need to report this to be DPS.      At time of discharge, the patient is feeling well and is in agreement with the plan of care.  I did personally discuss plans with the patient and family present.      CODE STATUS:  Full code.      Total time of discharge greater than 30 minutes, with over 50% of time spent in counseling and coordination of care.      This patient was discussed with Dr. Atif Bernal [facility].  He is in agreement with my assessment and plan of care.         ATIF BERNAL MD       As dictated by  GIBSON REYEZ PA-C            D: 2017 10:51   T: 2017 11:57   MT: lg      Name:     SAULO LYLES   MRN:      [id number]        Account:        [id number]   :      1975           Admit Date:     [id number]                                  Discharge Date: 2017      Document: [id number]       cc: Melody Garber MD

## 2017-02-28 NOTE — PROCEDURES
Revised      ELECTROENCEPHALOGRAM         ORDERING PHYSICIAN: Dr. Garber         EEG#          DATE OF SERVICE: 2017.         CLINICAL HISTORY: Saulo Marlow was witnessed to have a seizure on 2017 by his wife. He was started on Keppra. He returned to the hospital because of swelling of the tongue. This was considered an allergic reaction. His workup was ordered while he was in the observation unit.         CLINICAL FINDINGS: The awake EEG background contained 9 Hz alpha activity which was most prominent over the posterior head region and attenuated with eye opening. Lower amplitude beta activity was present over the anterior head regions. On rare occasions independent sharp wave was observed in the left temporal region. This occurred at time 12:26, 12:30 and 12:58 associated with hyperventilation. Photic stimulation elicited no other abnormalities. An EKG monitor at the time of the recording revealed sinus bradycardia at approximately 58 beats per minute. No other lateralizing abnormalities were noted during the recording.         IMPRESSION: This EEG reveals rare independent sharp waves localized in the left temporal lobe, which is suspicious for epileptic focus. Clinical correlation is advised. It is also noted that the patient was in a partially sleep deprived state for this study.      Revised EEG # lg 17         IRENE GARBER MD             D: 2017 16:14   T: 2017 08:56   MT: TAURUS      Name:     SAULO LYLES   MRN:      [id number]        Account:        [id number]   :      1975           Procedure Date: 2017      Document: [id number]

## 2018-08-22 ENCOUNTER — APPOINTMENT (OUTPATIENT)
Dept: GENERAL RADIOLOGY | Facility: CLINIC | Age: 43
End: 2018-08-22
Attending: EMERGENCY MEDICINE
Payer: COMMERCIAL

## 2018-08-22 ENCOUNTER — HOSPITAL ENCOUNTER (EMERGENCY)
Facility: CLINIC | Age: 43
Discharge: HOME | End: 2018-08-22
Attending: EMERGENCY MEDICINE | Admitting: EMERGENCY MEDICINE
Payer: COMMERCIAL

## 2018-08-22 VITALS
DIASTOLIC BLOOD PRESSURE: 81 MMHG | OXYGEN SATURATION: 99 % | WEIGHT: 182.4 LBS | BODY MASS INDEX: 26.11 KG/M2 | RESPIRATION RATE: 14 BRPM | SYSTOLIC BLOOD PRESSURE: 137 MMHG | HEIGHT: 70 IN | TEMPERATURE: 97.7 F

## 2018-08-22 DIAGNOSIS — R07.9 CHEST PAIN, UNSPECIFIED TYPE: ICD-10-CM

## 2018-08-22 LAB
ANION GAP SERPL CALCULATED.3IONS-SCNC: 7 MMOL/L (ref 3–14)
BASOPHILS # BLD AUTO: 0 10E9/L (ref 0–0.2)
BASOPHILS NFR BLD AUTO: 0.7 %
BUN SERPL-MCNC: 10 MG/DL (ref 7–30)
CALCIUM SERPL-MCNC: 8.8 MG/DL (ref 8.5–10.1)
CHLORIDE SERPL-SCNC: 106 MMOL/L (ref 94–109)
CO2 SERPL-SCNC: 28 MMOL/L (ref 20–32)
CREAT SERPL-MCNC: 0.87 MG/DL (ref 0.66–1.25)
DIFFERENTIAL METHOD BLD: ABNORMAL
EOSINOPHIL # BLD AUTO: 0.2 10E9/L (ref 0–0.7)
EOSINOPHIL NFR BLD AUTO: 3.5 %
ERYTHROCYTE [DISTWIDTH] IN BLOOD BY AUTOMATED COUNT: 13.3 % (ref 10–15)
GFR SERPL CREATININE-BSD FRML MDRD: >90 ML/MIN/1.7M2
GLUCOSE SERPL-MCNC: 92 MG/DL (ref 70–99)
HCT VFR BLD AUTO: 39.1 % (ref 40–53)
HGB BLD-MCNC: 12.9 G/DL (ref 13.3–17.7)
IMM GRANULOCYTES # BLD: 0 10E9/L (ref 0–0.4)
IMM GRANULOCYTES NFR BLD: 0.2 %
INTERPRETATION ECG - MUSE: NORMAL
LYMPHOCYTES # BLD AUTO: 2.1 10E9/L (ref 0.8–5.3)
LYMPHOCYTES NFR BLD AUTO: 38.1 %
MCH RBC QN AUTO: 29.9 PG (ref 26.5–33)
MCHC RBC AUTO-ENTMCNC: 33 G/DL (ref 31.5–36.5)
MCV RBC AUTO: 91 FL (ref 78–100)
MONOCYTES # BLD AUTO: 0.5 10E9/L (ref 0–1.3)
MONOCYTES NFR BLD AUTO: 8.6 %
NEUTROPHILS # BLD AUTO: 2.7 10E9/L (ref 1.6–8.3)
NEUTROPHILS NFR BLD AUTO: 48.9 %
NRBC # BLD AUTO: 0 10*3/UL
NRBC BLD AUTO-RTO: 0 /100
PLATELET # BLD AUTO: 259 10E9/L (ref 150–450)
POTASSIUM SERPL-SCNC: 4 MMOL/L (ref 3.4–5.3)
RBC # BLD AUTO: 4.32 10E12/L (ref 4.4–5.9)
SODIUM SERPL-SCNC: 141 MMOL/L (ref 133–144)
TROPONIN I SERPL-MCNC: <0.015 UG/L (ref 0–0.04)
WBC # BLD AUTO: 5.5 10E9/L (ref 4–11)

## 2018-08-22 PROCEDURE — 84484 ASSAY OF TROPONIN QUANT: CPT | Performed by: EMERGENCY MEDICINE

## 2018-08-22 PROCEDURE — 93005 ELECTROCARDIOGRAM TRACING: CPT

## 2018-08-22 PROCEDURE — 85025 COMPLETE CBC W/AUTO DIFF WBC: CPT | Performed by: EMERGENCY MEDICINE

## 2018-08-22 PROCEDURE — 99285 EMERGENCY DEPT VISIT HI MDM: CPT | Mod: 25

## 2018-08-22 PROCEDURE — 71046 X-RAY EXAM CHEST 2 VIEWS: CPT

## 2018-08-22 PROCEDURE — 80048 BASIC METABOLIC PNL TOTAL CA: CPT | Performed by: EMERGENCY MEDICINE

## 2018-08-22 ASSESSMENT — ENCOUNTER SYMPTOMS: SHORTNESS OF BREATH: 1

## 2018-08-22 NOTE — ED AVS SNAPSHOT
Emergency Department    [address] [place] [address]    Phone:  [phone]    Fax:  [phone]                                       Melvin Priest   MRN: [id number]    Department:   Emergency Department   Date of Visit:  8/22/2018           After Visit Summary Signature Page     I have received my discharge instructions, and my questions have been answered. I have discussed any challenges I see with this plan with the nurse or doctor.    ..........................................................................................................................................  Patient/Patient Representative Signature      ..........................................................................................................................................  Patient Representative Print Name and Relationship to Patient    ..................................................               ................................................  Date                                            Time    ..........................................................................................................................................  Reviewed by Signature/Title    ...................................................              ..............................................  Date                                                            Time

## 2018-08-22 NOTE — ED AVS SNAPSHOT
Emergency Department    [address] [facility] [address]    Phone:  [phone]    Fax:  [phone]                                       Melvin Priest   MRN: [id number]    Department:   Emergency Department   Date of Visit:  8/22/2018           Patient Information     Date Of Birth          1975        Your diagnoses for this visit were:     Chest pain, unspecified type        You were seen by Dale Hurley MD.      Follow-up Information     Follow up with Clinic, [facility] In 1 day.    Why:  If symptoms worsen    Contact information:    [address] [facility] [address] [phone]          Discharge Instructions          *CHEST PAIN, UNCERTAIN CAUSE    Based on your exam today, the exact cause of your chest pain is not certain. Your condition does not seem serious at this time, and your pain does not appear to be coming from your heart. However, sometimes the signs of a serious problem take more time to appear. Therefore, watch for the warning signs listed below.  HOME CARE:    1. Rest today and avoid strenuous activity.  2. Take any prescribed medicine as directed.  FOLLOW UP with your doctor in 1-3 days.   GET PROMPT MEDICAL ATTENTION if any of the following occur:    A change in the type of pain: if it feels different, becomes more severe, lasts longer, or begins to spread into your shoulder, arm, neck, jaw or back    Shortness of breath or increased pain with breathing    Weakness, dizziness, or fainting    Cough with blood or dark colored sputum (phlegm)    Fever over 101  F (38.3  C)    Swelling, pain or redness in one leg    8952-1572 [employer]. [address] [place] [address]. All rights reserved. This information is not intended as a substitute for professional medical care. Always follow your healthcare professional's instructions.  This information has been modified by your health care provider with permission from the  publisher.      24 Hour Appointment Hotline       To make an appointment at any [facility], call [phone] ([phone]). If you don't have a family doctor or clinic, we will help you find one. [facility] clinics are conveniently located to serve the needs of you and your family.          ED Discharge Orders     Exercise Stress Echocardiogram       Administration of IV contrast will be tailored to this examination per the appropriate written protocol listed in the Echocardiography department Protocol Book, or by the supervising Cardiologist. This may result in an order change.    Use of contrast is at the discretion of the supervising Cardiologist.                     Review of your medicines      Our records show that you are taking the medicines listed below. If these are incorrect, please call your family doctor or clinic.        Dose / Directions Last dose taken    carBAMazepine 200 MG 12 hr tablet   Commonly known as:  TEGretol XR   Dose:  200 mg   Quantity:  60 tablet        Take 1 tablet (200 mg) by mouth 2 times daily   Refills:  1        predniSONE 20 MG tablet   Commonly known as:  DELTASONE   Quantity:  6 tablet        Take two tablets (= 40mg) each day for 3 (three) days   Refills:  0                Procedures and tests performed during your visit     Basic metabolic panel    CBC with platelets differential    Cardiac Continuous Monitoring    EKG 12-lead, tracing only    Pulse oximetry nursing    Troponin I    XR Chest 2 Views      Orders Needing Specimen Collection     None      Pending Results     No orders found from 8/20/2018 to 8/23/2018.            Pending Culture Results     No orders found from 8/20/2018 to 8/23/2018.            Pending Results Instructions     If you had any lab results that were not finalized at the time of your Discharge, you can call the ED Lab Result RN at [phone]. You will be contacted by this team for any positive Lab results or changes in treatment. The  nurses are available 7 days a week from 10A to 6:30P.  You can leave a message 24 hours per day and they will return your call.        Test Results From Your Hospital Stay        8/22/2018  8:35 PM      Component Results     Component Value Ref Range & Units Status    WBC 5.5 4.0 - 11.0 10e9/L Final    RBC Count 4.32 (L) 4.4 - 5.9 10e12/L Final    Hemoglobin 12.9 (L) 13.3 - 17.7 g/dL Final    Hematocrit 39.1 (L) 40.0 - 53.0 % Final    MCV 91 78 - 100 fl Final    MCH 29.9 26.5 - 33.0 pg Final    MCHC 33.0 31.5 - 36.5 g/dL Final    RDW 13.3 10.0 - 15.0 % Final    Platelet Count 259 150 - 450 10e9/L Final    Diff Method Automated Method  Final    % Neutrophils 48.9 % Final    % Lymphocytes 38.1 % Final    % Monocytes 8.6 % Final    % Eosinophils 3.5 % Final    % Basophils 0.7 % Final    % Immature Granulocytes 0.2 % Final    Nucleated RBCs 0 0 /100 Final    Absolute Neutrophil 2.7 1.6 - 8.3 10e9/L Final    Absolute Lymphocytes 2.1 0.8 - 5.3 10e9/L Final    Absolute Monocytes 0.5 0.0 - 1.3 10e9/L Final    Absolute Eosinophils 0.2 0.0 - 0.7 10e9/L Final    Absolute Basophils 0.0 0.0 - 0.2 10e9/L Final    Abs Immature Granulocytes 0.0 0 - 0.4 10e9/L Final    Absolute Nucleated RBC 0.0  Final         8/22/2018  8:47 PM      Component Results     Component Value Ref Range & Units Status    Sodium 141 133 - 144 mmol/L Final    Potassium 4.0 3.4 - 5.3 mmol/L Final    Chloride 106 94 - 109 mmol/L Final    Carbon Dioxide 28 20 - 32 mmol/L Final    Anion Gap 7 3 - 14 mmol/L Final    Glucose 92 70 - 99 mg/dL Final    Urea Nitrogen 10 7 - 30 mg/dL Final    Creatinine 0.87 0.66 - 1.25 mg/dL Final    GFR Estimate >90 >60 mL/min/1.7m2 Final    Non  GFR Calc    GFR Estimate If Black >90 >60 mL/min/1.7m2 Final    African American GFR Calc    Calcium 8.8 8.5 - 10.1 mg/dL Final         8/22/2018  8:50 PM      Component Results     Component Value Ref Range & Units Status    Troponin I ES <0.015 0.000 - 0.045 ug/L Final     The 99th percentile for upper reference range is 0.045 ug/L.  Troponin values   in the range of 0.045 - 0.120 ug/L may be associated with risks of adverse   clinical events.           8/22/2018  9:03 PM      Narrative     CHEST TWO VIEW 8/22/2018 8:27 PM     COMPARISON: None    HISTORY: Chest pain, shortness of breath.     FINDINGS: The cardiac silhouette, pulmonary vasculature, lungs and  pleural spaces are within normal limits.        Impression     IMPRESSION: Clear lungs.    NICHOL CHRISTIANSON MD                Clinical Quality Measure: Blood Pressure Screening     Your blood pressure was checked while you were in the emergency department today. The last reading we obtained was  BP: 137/81 . Please read the guidelines below about what these numbers mean and what you should do about them.  If your systolic blood pressure (the top number) is less than 120 and your diastolic blood pressure (the bottom number) is less than 80, then your blood pressure is normal. There is nothing more that you need to do about it.  If your systolic blood pressure (the top number) is 120-139 or your diastolic blood pressure (the bottom number) is 80-89, your blood pressure may be higher than it should be. You should have your blood pressure rechecked within a year by a primary care provider.  If your systolic blood pressure (the top number) is 140 or greater or your diastolic blood pressure (the bottom number) is 90 or greater, you may have high blood pressure. High blood pressure is treatable, but if left untreated over time it can put you at risk for heart attack, stroke, or kidney failure. You should have your blood pressure rechecked by a primary care provider within the next 4 weeks.  If your provider in the emergency department today gave you specific instructions to follow-up with your doctor or provider even sooner than that, you should follow that instruction and not wait for up to 4 weeks for your follow-up visit.        Thank  "you for choosing [facility]       Thank you for choosing [facility] for your care. Our goal is always to provide you with excellent care. Hearing back from our patients is one way we can continue to improve our services. Please take a few minutes to complete the written survey that you may receive in the mail after you visit with us. Thank you!        "[employer] Information     [employer] lets you send messages to your doctor, view your test results, renew your prescriptions, schedule appointments and more. To sign up, go to [email or web address] . Click on \"Log in\" on the left side of the screen, which will take you to the Welcome page. Then click on \"Sign up Now\" on the right side of the page.     You will be asked to enter the access code listed below, as well as some personal information. Please follow the directions to create your username and password.     Your access code is: [email or web address]  Expires: 2018  9:44 PM     Your access code will  in 90 days. If you need help or a new code, please call your [facility] clinic or [phone].        Care EveryWhere ID     This is your Care EveryWhere ID. This could be used by other organizations to access your [facility] medical records  [id number]        Equal Access to Services     AMARIS DANIELS : Mitch Lazo, waaxda luqadaha, qaybta kaalmada adenakitayada, alok kidd. So [facility] [phone].    ATENCIÓN: Si habla español, tiene a ko disposición servicios gratuitos de asistencia lingüística. Llame al [phone].    We comply with applicable federal civil rights laws and [place] laws. We do not discriminate on the basis of race, color, national origin, age, disability, sex, sexual orientation, or gender identity.            After Visit Summary       This is your record. Keep this with you and show to your community pharmacist(s) and doctor(s) at your next visit.                  "

## 2018-08-23 NOTE — DISCHARGE INSTRUCTIONS
*CHEST PAIN, UNCERTAIN CAUSE    Based on your exam today, the exact cause of your chest pain is not certain. Your condition does not seem serious at this time, and your pain does not appear to be coming from your heart. However, sometimes the signs of a serious problem take more time to appear. Therefore, watch for the warning signs listed below.  HOME CARE:    1. Rest today and avoid strenuous activity.  2. Take any prescribed medicine as directed.  FOLLOW UP with your doctor in 1-3 days.   GET PROMPT MEDICAL ATTENTION if any of the following occur:    A change in the type of pain: if it feels different, becomes more severe, lasts longer, or begins to spread into your shoulder, arm, neck, jaw or back    Shortness of breath or increased pain with breathing    Weakness, dizziness, or fainting    Cough with blood or dark colored sputum (phlegm)    Fever over 101  F (38.3  C)    Swelling, pain or redness in one leg    4058-1664 [employer]. [address] [place] [address]. All rights reserved. This information is not intended as a substitute for professional medical care. Always follow your healthcare professional's instructions.  This information has been modified by your health care provider with permission from the publisher.

## 2018-08-23 NOTE — ED PROVIDER NOTES
"  History     Chief Complaint:  Chest pain     HPI   Melvin Priest is a 43 year old male with a history of seizures who presents to the emergency department with chest pain. The patient came from the [facility] clinic because of intermittent chest pain that began a couple days ago and it feels as if \"he has to catch his breath.\" The patient denies any thing that brings the pain on and denies anything that alleviates the pain. The patient states that he fell about a week ago and tried to catch himself on the railing. He thinks this may potentially be related to the chest pain.      Cardiac/PE/DVT Risk Factors:  History of hypertension - No  History of hyperlipidemia - No  History of diabetes - no  History of smoking - yes  Personal history of PE/DVT - no  Family history of PE/DVT - no  Family history of heart complications - no  Recent travel - no  Recent surgery - no  Other immobilizations - no  Cancer - no    Allergies:  Keppra [Levetiracetam]     Medications:    Carbamazepine  Prednisone     Past Medical History:    Seizures  Allergic Reaction   Obstructive sleep apnea     Past Surgical History:    Kidney donation     Family History:    History reviewed. No pertinent family history.     Social History:  The patient was accompanied to the ED by his wife.  Smoking Status: Former Smoker 1.00 PPD Cigarettes   Smokeless Tobacco: Never used   Alcohol Use: 1 drink almost nightly   Marital Status:       Review of Systems   Respiratory: Positive for shortness of breath.    Cardiovascular: Positive for chest pain.   All other systems reviewed and are negative.    Physical Exam   First Vitals:  Patient Vitals for the past 24 hrs:   BP Temp Temp src Heart Rate Resp SpO2 Height Weight   08/22/18 2100 - - - 52 14 99 % - -   08/22/18 2045 - - - 52 10 98 % - -   08/22/18 2031 - - - 53 11 97 % - -   08/22/18 2030 - - - 51 (!) 6 97 % - -   08/22/18 1941 137/81 97.7  F (36.5  C) Oral 53 16 98 % 1.778 m (5' 10\") 82.7 kg (182 lb " 6.4 oz)       Physical Exam    General: Alert and Interactive.   Head: No signs of trauma.   Mouth/Throat: Oropharynx is clear and moist.   Eyes: Conjunctivae are normal. Pupils are equal, round, and reactive to light.   Neck: Normal range of motion. No nuchal rigidity.   CV: Normal rate and regular rhythm.  Bradycardic.   Resp: Effort normal and breath sounds normal. No respiratory distress.   GI: Soft. There is no tenderness or guarding.   MSK: Normal range of motion. no edema.   Neuro: The patient is alert and oriented to person, place, and time.  PERRLA, EOMI, strength in upper/lower extremities normal and symmetrical.   Sensation normal. Speech normal.  GCS eye subscore is 4. GCS verbal subscore is 5. GCS motor subscore is 6.   Skin: Skin is warm and dry. No rash noted.   Psych: normal mood and affect. behavior is normal.   Emergency Department Course   ECG:   Completed at 1948.  Read at 1955.   Sinus Bradycardia   Otherwise normal ECG   Rate 45 bpm. OK interval 186. QRS duration 94. QT/QTc 456/394. P-R-T axes 21 56 43.    Imaging:  Radiology findings were communicated with the patient and family who voiced understanding of the findings.  XR Chest 2 Views:   Impression: Clear lungs   Report per radiology     Laboratory:  Laboratory findings were communicated with the patient and family who voiced understanding of the findings.    CBC: HGB 12.9 (L) o/w WNL. (WBC 5.5, )     BMP: AWNL (Creatinine 0.87)    Troponin (Collected 2018): <0.015    Emergency Department Course:  Nursing notes and vitals reviewed.  2012: I performed an exam of the patient as documented above.     Findings and plan explained to the Patient and spouse. Patient discharged home with instructions regarding supportive care, medications, and reasons to return. The importance of close follow-up was reviewed.    I personally reviewed the laboratory and imaging results with the Patient and spouse and answered all related questions prior to  discharge.    Impression & Plan    Medical Decision Making:  Melvin Priest is a 43 year old male who presents with chest pain.  The work up in the Emergency Department is negative.  The differential diagnosis of chest pain is broad and includes life threatening etiologies such as acute coronary syndrome, myocardial infarction, pulmonary embolism, acute aortic dissection, amongst others.  The patient's EKG was nonischemic and troponin was normal.  The chest pain symptom complex would be atypical for coronary ischemia.  The patient is low risk for PE.  Chest xray reveals no evidence of pneumonia or pneumothorax.  No serious etiology for the chest pain were detected today during this visit.      Close follow up with primary care is indicated should the pain continue, as further work up may be performed; this was made clear to the patient, who understands.     The order was placed for an outpatient cardiac stress test.    Diagnosis:    ICD-10-CM    1. Chest pain, unspecified type R07.9 Exercise Stress Echocardiogram       Disposition:  discharged to home    Hansel Vences  8/22/2018    EMERGENCY DEPARTMENT  Scribe Disclosure:  I, Hansel Vences, am serving as a scribe at 7:51 PM on 8/22/2018 to document services personally performed by Dale Hurley MD based on my observations and the provider's statements to me.        Dale Hurley MD  08/23/18 1556       Dale Hurley MD  08/23/18 1550

## 2020-01-01 ENCOUNTER — APPOINTMENT (OUTPATIENT)
Dept: GENERAL RADIOLOGY | Facility: CLINIC | Age: 45
End: 2020-01-01
Attending: EMERGENCY MEDICINE
Payer: COMMERCIAL

## 2020-01-01 ENCOUNTER — HOSPITAL ENCOUNTER (EMERGENCY)
Facility: CLINIC | Age: 45
Discharge: HOME | End: 2020-01-01
Attending: EMERGENCY MEDICINE | Admitting: EMERGENCY MEDICINE
Payer: COMMERCIAL

## 2020-01-01 VITALS
WEIGHT: 178 LBS | DIASTOLIC BLOOD PRESSURE: 78 MMHG | RESPIRATION RATE: 16 BRPM | SYSTOLIC BLOOD PRESSURE: 126 MMHG | BODY MASS INDEX: 24.92 KG/M2 | TEMPERATURE: 97.9 F | HEIGHT: 71 IN | HEART RATE: 60 BPM | OXYGEN SATURATION: 99 %

## 2020-01-01 DIAGNOSIS — R07.89 CHEST PAIN, NON-CARDIAC: ICD-10-CM

## 2020-01-01 DIAGNOSIS — R07.89 CHEST DISCOMFORT: ICD-10-CM

## 2020-01-01 LAB
ANION GAP SERPL CALCULATED.3IONS-SCNC: 6 MMOL/L (ref 3–14)
BASOPHILS # BLD AUTO: 0 10E9/L (ref 0–0.2)
BASOPHILS NFR BLD AUTO: 0.6 %
BUN SERPL-MCNC: 11 MG/DL (ref 7–30)
CALCIUM SERPL-MCNC: 8.9 MG/DL (ref 8.5–10.1)
CHLORIDE SERPL-SCNC: 106 MMOL/L (ref 94–109)
CO2 SERPL-SCNC: 27 MMOL/L (ref 20–32)
CREAT SERPL-MCNC: 0.76 MG/DL (ref 0.66–1.25)
DIFFERENTIAL METHOD BLD: ABNORMAL
EOSINOPHIL # BLD AUTO: 0.2 10E9/L (ref 0–0.7)
EOSINOPHIL NFR BLD AUTO: 4.4 %
ERYTHROCYTE [DISTWIDTH] IN BLOOD BY AUTOMATED COUNT: 13.3 % (ref 10–15)
GFR SERPL CREATININE-BSD FRML MDRD: >90 ML/MIN/{1.73_M2}
GLUCOSE SERPL-MCNC: 93 MG/DL (ref 70–99)
HCT VFR BLD AUTO: 38.2 % (ref 40–53)
HGB BLD-MCNC: 12.9 G/DL (ref 13.3–17.7)
IMM GRANULOCYTES # BLD: 0 10E9/L (ref 0–0.4)
IMM GRANULOCYTES NFR BLD: 0.2 %
INTERPRETATION ECG - MUSE: NORMAL
LYMPHOCYTES # BLD AUTO: 2 10E9/L (ref 0.8–5.3)
LYMPHOCYTES NFR BLD AUTO: 37.9 %
MCH RBC QN AUTO: 30.5 PG (ref 26.5–33)
MCHC RBC AUTO-ENTMCNC: 33.8 G/DL (ref 31.5–36.5)
MCV RBC AUTO: 90 FL (ref 78–100)
MONOCYTES # BLD AUTO: 0.5 10E9/L (ref 0–1.3)
MONOCYTES NFR BLD AUTO: 9.2 %
NEUTROPHILS # BLD AUTO: 2.5 10E9/L (ref 1.6–8.3)
NEUTROPHILS NFR BLD AUTO: 47.7 %
NRBC # BLD AUTO: 0 10*3/UL
NRBC BLD AUTO-RTO: 0 /100
PLATELET # BLD AUTO: 232 10E9/L (ref 150–450)
POTASSIUM SERPL-SCNC: 3.8 MMOL/L (ref 3.4–5.3)
RBC # BLD AUTO: 4.23 10E12/L (ref 4.4–5.9)
SODIUM SERPL-SCNC: 139 MMOL/L (ref 133–144)
TROPONIN I SERPL-MCNC: <0.015 UG/L (ref 0–0.04)
WBC # BLD AUTO: 5.2 10E9/L (ref 4–11)

## 2020-01-01 PROCEDURE — 99285 EMERGENCY DEPT VISIT HI MDM: CPT | Mod: 25

## 2020-01-01 PROCEDURE — 84484 ASSAY OF TROPONIN QUANT: CPT | Performed by: EMERGENCY MEDICINE

## 2020-01-01 PROCEDURE — 25000132 ZZH RX MED GY IP 250 OP 250 PS 637: Performed by: EMERGENCY MEDICINE

## 2020-01-01 PROCEDURE — 25000125 ZZHC RX 250: Performed by: EMERGENCY MEDICINE

## 2020-01-01 PROCEDURE — 80048 BASIC METABOLIC PNL TOTAL CA: CPT | Performed by: EMERGENCY MEDICINE

## 2020-01-01 PROCEDURE — 93005 ELECTROCARDIOGRAM TRACING: CPT

## 2020-01-01 PROCEDURE — 71046 X-RAY EXAM CHEST 2 VIEWS: CPT

## 2020-01-01 PROCEDURE — 85025 COMPLETE CBC W/AUTO DIFF WBC: CPT | Performed by: EMERGENCY MEDICINE

## 2020-01-01 RX ORDER — CYCLOBENZAPRINE HCL 10 MG
10 TABLET ORAL 3 TIMES DAILY PRN
Qty: 20 TABLET | Refills: 0 | Status: SHIPPED | OUTPATIENT
Start: 2020-01-01 | End: 2020-01-07

## 2020-01-01 RX ORDER — KETOROLAC TROMETHAMINE 30 MG/ML
30 INJECTION, SOLUTION INTRAMUSCULAR; INTRAVENOUS ONCE
Status: DISCONTINUED | OUTPATIENT
Start: 2020-01-01 | End: 2020-01-01 | Stop reason: HOSPADM

## 2020-01-01 RX ORDER — CYCLOBENZAPRINE HCL 10 MG
10 TABLET ORAL ONCE
Status: COMPLETED | OUTPATIENT
Start: 2020-01-01 | End: 2020-01-01

## 2020-01-01 RX ADMIN — LIDOCAINE HYDROCHLORIDE 30 ML: 20 SOLUTION ORAL; TOPICAL at 18:22

## 2020-01-01 RX ADMIN — CYCLOBENZAPRINE 10 MG: 10 TABLET, FILM COATED ORAL at 18:21

## 2020-01-01 ASSESSMENT — ENCOUNTER SYMPTOMS
NAUSEA: 0
DIAPHORESIS: 0
BACK PAIN: 1
CHILLS: 0
CHEST TIGHTNESS: 1
SHORTNESS OF BREATH: 1
DYSURIA: 0
VOMITING: 0
ABDOMINAL PAIN: 0
HEMATURIA: 0
COLOR CHANGE: 0
BLOOD IN STOOL: 0
FEVER: 0

## 2020-01-01 ASSESSMENT — MIFFLIN-ST. JEOR: SCORE: 1719.53

## 2020-01-01 NOTE — ED AVS SNAPSHOT
Emergency Department  [address] [place] [address]  Phone:  [phone]  Fax:  [phone]                                    Melvin Priest   MRN: [id number]    Department:   Emergency Department   Date of Visit:  1/1/2020           After Visit Summary Signature Page    I have received my discharge instructions, and my questions have been answered. I have discussed any challenges I see with this plan with the nurse or doctor.    ..........................................................................................................................................  Patient/Patient Representative Signature      ..........................................................................................................................................  Patient Representative Print Name and Relationship to Patient    ..................................................               ................................................  Date                                   Time    ..........................................................................................................................................  Reviewed by Signature/Title    ...................................................              ..............................................  Date                                               Time          22EPIC Rev 08/18

## 2020-01-02 NOTE — DISCHARGE INSTRUCTIONS
1. -Take acetaminophen 500 to 1000 mg by mouth every 4 to 6 hours as needed for pain or fever.  Do not take more than 4000 mg in 24 hours.  Do not take within 6 hours of another acetaminophen containing medication such as norco (vicodin) or percocet.  - Take ibuprofen 600 to 800 mg by mouth every 6 to 8 hours as needed for pain or fever  2. Please follow-up with your primary care doctor in 1 week for recheck.  3.  Please return to the emergency department as needed for new or worsening symptoms such as worsening chest pain, shortness of breath, fainting, vomiting unable to keep anything down, any other concerning symptoms.

## 2020-01-02 NOTE — ED PROVIDER NOTES
History   Chief Complaint:  Chest tightness    HPI   Melvin Priest is a 44 year old male who presents with chest tightness. The patient says that the chest tightness has been occurring intermittently for the past week with associated pain between the shoulder blades. The tightness extends across the patient's chest. It is not painful but feels 'unusual.' The patient reports that he does not wake up with the tightness though it often starts after he drinks his coffee. However, the patient states that the pain does not always come after he drinks liquids. He also reports that he shoveled yesterday and his chest tightness was not worse during that time. He had somewhat similar symptoms in 2018, and received a stress echocardiogram which was negative though he does report the tightness in his chest is somewhat different.  He endorses mild shortness of breath, but says that he doesn't feel like he is gasping for air reporting it is not difficult for him to take a deep breath.  He has not taken any medications for his symptoms. The patient denies nausea, vomiting, diaphoresis, severe shortness of breath, lightheadedness, syncope, abdominal pain, tarry stools, dysuria, hematuria, fevers, chills, skin changes or rashes, or leg swelling. He has had acid reflux in the past.    CARDIAC RISK FACTORS:  Sex:    Male  Tobacco:   Former  Hypertension:   No  Hyperlipidemia:  No  Diabetes:   No  Family History:  No    PE/DVT RISK FACTORS:  Sex:    Male  Hormones:   No  Tobacco:   Former  Cancer:   No  Travel:   No  Surgery:   No  Other immobilization: No  Personal history:  No  Family history:  No    Allergies:  Keppra    Medications:    Tegretol  Deltasone    Past Medical History:    Seizures  Obstructive sleep apnea    Past Surgical History:    Kidney donation    Family History:    Father: esophageal cancer, kidney disease    Social History:  The patient was accompanied to the ED by spouse.  Smoking Status: Former  "smoker  Smokeless Tobacco: Never Used  Alcohol Use: Yes 5 drinks per week  Drug Use: No  PCP: Wei Najera  Marital Status:       Review of Systems   Constitutional: Negative for chills, diaphoresis and fever.   Respiratory: Positive for chest tightness and shortness of breath (mild).    Cardiovascular: Negative for leg swelling.   Gastrointestinal: Negative for abdominal pain, blood in stool, nausea and vomiting.   Genitourinary: Negative for dysuria and hematuria.   Musculoskeletal: Positive for back pain.   Skin: Negative for color change and rash.   Neurological: Negative for syncope.   All other systems reviewed and are negative.    Physical Exam     Patient Vitals for the past 24 hrs:   BP Temp Temp src Pulse Resp SpO2 Height Weight   01/01/20 1747 126/78 97.9  F (36.6  C) Temporal 60 16 99 % 1.803 m (5' 11\") 80.7 kg (178 lb)       Physical Exam  Constitutional: Well developed, nontox appearance  Head: Atraumatic.   Mouth/Throat: Oropharynx is clear and moist.   Neck:  no stridor  Eyes: no scleral icterus  Cardiovascular: RRR, 2+ bilat radial, DP pulses  Pulmonary/Chest: nml resp effort, Clear BS bilat, mildly reproducible right parasternal tenderness  Abdominal: ND, +BS, soft, NT, no rebound or guarding  Back: Mildly reproducible L paraspinal thoracic muscle tenderness   : no CVA tenderness bilat  Ext: Warm, well perfused, no edema  Neurological: A&O, symmetric facies, moves ext x4  Skin: Skin is warm and dry.   Psychiatric: Behavior is normal. Thought content normal.   Nursing note and vitals reviewed.    Emergency Department Course   ECG:  ECG taken at 1748, ECG read at 1755  Normal sinus rhythm  Normal ECG  No significant change compared to EKG dated 8/22/18.  Rate 60 bpm. WA interval 176 ms. QRS duration 98 ms. QT/QTc 408/408 ms. P-R-T axes 62 64 51.    Imaging:  Radiology findings were communicated with the patient who voiced understanding of the findings.    XR Chest 2 Views  No " evidence of acute cardiopulmonary disease is seen.  Reading per radiology     Laboratory:  Laboratory findings were communicated with the patient  who voiced understanding of the findings.\    CBC: WBC 5.2,  HGB 12.9 (low),   BMP: WNL (Creatinine 0.76)  Troponin (Collected 1817): <0.015    Interventions:  1821 Flexeril 10 mg Oral  1822 lidocaine 30 mL Oral    Emergency Department Course:  Nursing notes and vitals reviewed.    1758 I performed an exam of the patient as documented above.     IV was inserted and blood was drawn for laboratory testing, results above.    The patient was sent for a XR Chest while in the emergency department, results above.      1910 I updated the patient.    Findings and plan explained to the Patient. Patient discharged home with instructions regarding supportive care, medications, and reasons to return. The importance of close follow-up was reviewed. The patient was prescribed Flexeril and Prilosec.    Impression & Plan      Medical Decision Making:  Melvin Priest is a 44 year old male presenting w/ chest discomfort, back discomfort     DDx includes ACS, unstable angina, chest wall pain, GERD, pneumothorax, pneumonia.  EKG interpretation as noted above sig for no acute ischemic findings.  Labs and imaging ordered as noted above.  Labs significant for neg trop, minimal anemia.  Imaging sig for no acute cardiopulmonary disease.  Interventions as noted above with mild improvement in symptoms.  Given pt is low risk for CAD, chest discomfort is nonexertional (pt able to shovel and exercise w/o chest pain) and unremarkable stress echo less than 2 years ago, I do not think further work-up for ACS is warranted at this time.  Doubt ACS, PE, dissection.  Possible MSK pain and GERD.  At this time I feel the patient is safe for discharge.  Recommendations given regarding follow up with PCP and return to the emergency department as needed for new or worsening symptoms.  Pt and wife counseled  on all results, diagnosis and disposition.  They are understanding and agreeable to plan. Patient discharged in stable condition.      Diagnosis:    ICD-10-CM    1. Chest discomfort R07.89    2. Chest pain, non-cardiac R07.89        Disposition:   The patient was discharged to home.    Discharge Medications:   Details   cyclobenzaprine (FLEXERIL) 10 MG tablet Take 1 tablet (10 mg) by mouth 3 times daily as needed for muscle spasms, Disp-20 tablet, R-0, Local Print      omeprazole (PRILOSEC) 20 MG DR capsule Take 1 capsule (20 mg) by mouth daily, Disp-30 capsule, R-0, Local Print             Scribe Disclosure:  I, Uriah Potter, am serving as a scribe at 7:12 PM on 1/1/2020 to document services personally performed by Braxton Cuello MD based on my observations and the provider's statements to me.     I, Jennifer Salazar, am serving as a scribe at 7:12 PM on 1/1/2020 to document services personally performed by Braxton Cuello MD based on my observations and the provider's statements to me.    EMERGENCY DEPARTMENT       Braxton Cuello MD  01/02/20 2100

## 2021-12-04 ENCOUNTER — OFFICE VISIT (OUTPATIENT)
Dept: URGENT CARE | Facility: URGENT CARE | Age: 46
End: 2021-12-04
Payer: COMMERCIAL

## 2021-12-04 VITALS — TEMPERATURE: 97.5 F | HEART RATE: 73 BPM | SYSTOLIC BLOOD PRESSURE: 133 MMHG | DIASTOLIC BLOOD PRESSURE: 82 MMHG

## 2021-12-04 DIAGNOSIS — S61.211A LACERATION OF LEFT INDEX FINGER WITHOUT FOREIGN BODY WITHOUT DAMAGE TO NAIL, INITIAL ENCOUNTER: Primary | ICD-10-CM

## 2021-12-04 PROCEDURE — 12001 RPR S/N/AX/GEN/TRNK 2.5CM/<: CPT | Performed by: INTERNAL MEDICINE

## 2021-12-05 NOTE — PROGRESS NOTES
SUBJECTIVE:   46 year old male sustained laceration of left index finger 1 hours ago. Nature of injury: accidental cut with kitchen knife. Tetanus vaccination status reviewed: tetanus status unknown to the patient.  He's going to check with PCP     OBJECTIVE:   Patient appears well, vitals are normal. Laceration 1 cm noted.  This is a shallow, semilunar flap type of laceration with good natural approximation of the skin edges. Description: clean wound edges, no foreign bodies. Neurovascular and tendon structures are intact.    ASSESSMENT:   Laceration as described.    PLAN:   Wound is cleaned with water and surgical soap; wound edges easily approximate and primary hemostasis obtained; wound edges are secured with Exofin skin adhesive.    Solomon Hawkins MD

## 2022-02-09 ENCOUNTER — HOSPITAL ENCOUNTER (EMERGENCY)
Facility: CLINIC | Age: 47
Discharge: HOME | End: 2022-02-09
Attending: EMERGENCY MEDICINE | Admitting: EMERGENCY MEDICINE
Payer: COMMERCIAL

## 2022-02-09 VITALS
RESPIRATION RATE: 16 BRPM | SYSTOLIC BLOOD PRESSURE: 134 MMHG | OXYGEN SATURATION: 99 % | WEIGHT: 185 LBS | DIASTOLIC BLOOD PRESSURE: 85 MMHG | TEMPERATURE: 98.3 F | BODY MASS INDEX: 25.9 KG/M2 | HEIGHT: 71 IN | HEART RATE: 70 BPM

## 2022-02-09 DIAGNOSIS — G40.909 RECURRENT SEIZURES (H): ICD-10-CM

## 2022-02-09 LAB
ANION GAP SERPL CALCULATED.3IONS-SCNC: 8 MMOL/L (ref 3–14)
BASOPHILS # BLD AUTO: 0.1 10E3/UL (ref 0–0.2)
BASOPHILS NFR BLD AUTO: 1 %
BUN SERPL-MCNC: 15 MG/DL (ref 7–30)
CALCIUM SERPL-MCNC: 9 MG/DL (ref 8.5–10.1)
CHLORIDE BLD-SCNC: 108 MMOL/L (ref 94–109)
CO2 SERPL-SCNC: 23 MMOL/L (ref 20–32)
CREAT SERPL-MCNC: 0.87 MG/DL (ref 0.66–1.25)
EOSINOPHIL # BLD AUTO: 0.2 10E3/UL (ref 0–0.7)
EOSINOPHIL NFR BLD AUTO: 4 %
ERYTHROCYTE [DISTWIDTH] IN BLOOD BY AUTOMATED COUNT: 13.2 % (ref 10–15)
GFR SERPL CREATININE-BSD FRML MDRD: >90 ML/MIN/1.73M2
GLUCOSE BLD-MCNC: 110 MG/DL (ref 70–99)
HCT VFR BLD AUTO: 39.9 % (ref 40–53)
HGB BLD-MCNC: 13.4 G/DL (ref 13.3–17.7)
IMM GRANULOCYTES # BLD: 0 10E3/UL
IMM GRANULOCYTES NFR BLD: 0 %
LYMPHOCYTES # BLD AUTO: 2 10E3/UL (ref 0.8–5.3)
LYMPHOCYTES NFR BLD AUTO: 32 %
MCH RBC QN AUTO: 29.8 PG (ref 26.5–33)
MCHC RBC AUTO-ENTMCNC: 33.6 G/DL (ref 31.5–36.5)
MCV RBC AUTO: 89 FL (ref 78–100)
MONOCYTES # BLD AUTO: 0.5 10E3/UL (ref 0–1.3)
MONOCYTES NFR BLD AUTO: 8 %
NEUTROPHILS # BLD AUTO: 3.3 10E3/UL (ref 1.6–8.3)
NEUTROPHILS NFR BLD AUTO: 55 %
NRBC # BLD AUTO: 0 10E3/UL
NRBC BLD AUTO-RTO: 0 /100
PLATELET # BLD AUTO: 271 10E3/UL (ref 150–450)
POTASSIUM BLD-SCNC: 4 MMOL/L (ref 3.4–5.3)
RBC # BLD AUTO: 4.49 10E6/UL (ref 4.4–5.9)
SODIUM SERPL-SCNC: 139 MMOL/L (ref 133–144)
WBC # BLD AUTO: 6.1 10E3/UL (ref 4–11)

## 2022-02-09 PROCEDURE — 82310 ASSAY OF CALCIUM: CPT | Performed by: EMERGENCY MEDICINE

## 2022-02-09 PROCEDURE — 96374 THER/PROPH/DIAG INJ IV PUSH: CPT

## 2022-02-09 PROCEDURE — 85025 COMPLETE CBC W/AUTO DIFF WBC: CPT | Performed by: EMERGENCY MEDICINE

## 2022-02-09 PROCEDURE — 99284 EMERGENCY DEPT VISIT MOD MDM: CPT | Mod: 25

## 2022-02-09 PROCEDURE — 36415 COLL VENOUS BLD VENIPUNCTURE: CPT | Performed by: EMERGENCY MEDICINE

## 2022-02-09 PROCEDURE — 250N000011 HC RX IP 250 OP 636: Performed by: EMERGENCY MEDICINE

## 2022-02-09 PROCEDURE — 250N000013 HC RX MED GY IP 250 OP 250 PS 637: Performed by: EMERGENCY MEDICINE

## 2022-02-09 RX ORDER — CARBAMAZEPINE 200 MG/1
200 TABLET, EXTENDED RELEASE ORAL 2 TIMES DAILY
Qty: 60 TABLET | Refills: 0 | Status: SHIPPED | OUTPATIENT
Start: 2022-02-09 | End: 2022-03-11

## 2022-02-09 RX ORDER — KETOROLAC TROMETHAMINE 15 MG/ML
15 INJECTION, SOLUTION INTRAMUSCULAR; INTRAVENOUS ONCE
Status: COMPLETED | OUTPATIENT
Start: 2022-02-09 | End: 2022-02-09

## 2022-02-09 RX ORDER — CARBAMAZEPINE 100 MG/1
200 CAPSULE, EXTENDED RELEASE ORAL ONCE
Status: COMPLETED | OUTPATIENT
Start: 2022-02-09 | End: 2022-02-09

## 2022-02-09 RX ORDER — CARBAMAZEPINE 200 MG/1
200 TABLET ORAL ONCE
Status: DISCONTINUED | OUTPATIENT
Start: 2022-02-09 | End: 2022-02-09

## 2022-02-09 RX ADMIN — CARBAMAZEPINE 200 MG: 100 CAPSULE, EXTENDED RELEASE ORAL at 06:55

## 2022-02-09 RX ADMIN — KETOROLAC TROMETHAMINE 15 MG: 15 INJECTION, SOLUTION INTRAMUSCULAR; INTRAVENOUS at 06:13

## 2022-02-09 ASSESSMENT — MIFFLIN-ST. JEOR: SCORE: 1741.28

## 2022-02-09 NOTE — ED TRIAGE NOTES
46 year old male presents to the ED by ambulance for seizure activity. Per EMS, pt's wife told them that he was diagnosed with seizures 5 years ago and has not had a seizure in over a year so he was taken off his medications to prevent them. Per EMS, pt does not remember coming down the stairs with them and was very post-tical. Wife told EMS that her husbands eyes rolled back in his head during the seizure. Pt's blood glucose was 128.

## 2022-02-09 NOTE — ED NOTES
Bed: ED06  Expected date: 2/9/22  Expected time: 4:55 AM  Means of arrival: Ambulance  Comments:  HEMS 425 46M seizure

## 2022-02-09 NOTE — ED PROVIDER NOTES
History     Chief Complaint:  Seizures       HPI   Melvin Priest is a 46 year old male who presents with a seizure.  The patient was diagnosed with seizures several years ago, started on Keppra with an allergic reaction.  He was switched to carbamazepine which she was on for approximately 4 years with good results.  He has been working closely with his neurologist and it was determined that he could stop the carbamazepine as he had not had a seizure in many years.  Unfortunately, the patient's wife noted the patient was making unusual noises and checked on him, finding him to have a full grand mal seizure.  This lasted less than a minute.  EMS was called.  Patient was postictal and did not recall any of these events, starting to have recollection once he was in the ambulance.  At the time of my assessment, he notes that his body feels generally sore, but he denies biting his tongue or cheek.  He denies a loss of bowel or bladder control.  He denies chest pain, abdominal pain, or shortness of breath.  He has no other complaints at this juncture.    Allergies:  Keppra [Levetiracetam]     Medications:    No daily medications    Past Medical History:    Past Medical History:   Diagnosis Date     NO ACTIVE PROBLEMS      Seizures (H)        Patient Active Problem List    Diagnosis Date Noted     Allergic reaction 01/05/2017     Priority: Medium     ROBIN (obstructive sleep apnea) 05/20/2016     Priority: Medium     Diagnostic Study  Sleep Study 5/18/2016 - (180.0 lbs) AHI 7.6          Past Surgical History:    Past Surgical History:   Procedure Laterality Date     kidney donation          Family History:    family history includes Diabetes in his maternal grandfather.    Social History:   reports that he quit smoking about 16 years ago. His smoking use included cigarettes. He has a 8.00 pack-year smoking history. He has never used smokeless tobacco. He reports current alcohol use. He reports that he does not use  "drugs.    PCP: [facility]     Review of Systems  Pertinent positives and negatives as above.  A 14-point review of systems was performed with all other systems reviewed as negative.      Physical Exam     Patient Vitals for the past 24 hrs:   BP Temp Temp src Pulse Resp SpO2 Height Weight   02/09/22 0505 134/86 98.3  F (36.8  C) Oral 84 16 97 % 1.803 m (5' 11\") 83.9 kg (185 lb)        Physical Exam  Eye:  Pupils are equal, round, and reactive.  Extraocular movements intact.    ENT:  No rhinorrhea.  Moist mucus membranes.  Normal tongue and tonsil.    Cardiac:  Regular rate and rhythm.  No murmurs, gallops, or rubs.    Pulmonary:  Clear to auscultation bilaterally.  No wheezes, rales, or rhonchi.    Abdomen:  Positive bowel sounds.  Abdomen is soft and non-distended, without focal tenderness.    Musculoskeletal:  Normal movement of all extremities without evidence for deficit.    Skin:  Warm and dry without rashes.    Neurologic:  CN II - XII intact.  5/5 strength in all extremities.  Normal sensation throughout.  Normal finger to nose and heel to shin.  2+ patellar reflexes.  Normal gait.    Psychiatric:  Normal affect with appropriate interaction with examiner.    Emergency Department Course       Laboratory:  Labs Ordered and Resulted from Time of ED Arrival to Time of ED Departure   BASIC METABOLIC PANEL - Abnormal       Result Value    Sodium 139      Potassium 4.0      Chloride 108      Carbon Dioxide (CO2) 23      Anion Gap 8      Urea Nitrogen 15      Creatinine 0.87      Calcium 9.0      Glucose 110 (*)     GFR Estimate >90     CBC WITH PLATELETS AND DIFFERENTIAL - Abnormal    WBC Count 6.1      RBC Count 4.49      Hemoglobin 13.4      Hematocrit 39.9 (*)     MCV 89      MCH 29.8      MCHC 33.6      RDW 13.2      Platelet Count 271      % Neutrophils 55      % Lymphocytes 32      % Monocytes 8      % Eosinophils 4      % Basophils 1      % Immature Granulocytes 0      NRBCs per 100 WBC 0      " Absolute Neutrophils 3.3      Absolute Lymphocytes 2.0      Absolute Monocytes 0.5      Absolute Eosinophils 0.2      Absolute Basophils 0.1      Absolute Immature Granulocytes 0.0      Absolute NRBCs 0.0          Interventions:  Medications   ketorolac (TORADOL) injection 15 mg (15 mg Intravenous Given 2/9/22 0613)   carBAMazepine (CARBATROL) 12 hr capsule 200 mg (200 mg Oral Given 2/9/22 0655)        Emergency Department Course:  Past medical records, nursing notes, and vitals reviewed.  I performed an exam of the patient and obtained history, as documented above.      Impression & Plan      Medical Decision Making:  This unfortunate gentleman with a history of seizures presents to us after recurrent seizure since stopping his antiepileptics earlier this year.  This was all done under the care of his neurologist.  Unfortunately, it appears as though he may require lifelong antiepileptics.  I did review his chart where he underwent advanced imaging and EEG monitoring and is an established patient with [facility].  Considering that he has had this work-up and nothing about this presentation seems unusual including his normal labs and return to normal mental state with a normal neuro exam, I feel he is safe for discharge home.  We will reinitiate his carbamazepine, 200 mg XR twice daily.  I advised him to speak with his neurologist to make them aware of this return of his seizure and to discuss ongoing treatment.  Otherwise, he will return to us for any worsening of condition or other emergent concerns.    Diagnosis:    ICD-10-CM    1. Recurrent seizures (H)  G40.909         Discharge Medications:     Medication List      Started    carBAMazepine 200 MG 12 hr tablet  Commonly known as: TEGretol XR  200 mg, Oral, 2 TIMES DAILY             2/9/2022   Trierweiler, Chad A, MD Trierweiler, Chad A, MD  02/09/22 8026

## 2022-04-30 ENCOUNTER — HEALTH MAINTENANCE LETTER (OUTPATIENT)
Age: 47
End: 2022-04-30

## 2022-11-20 ENCOUNTER — HEALTH MAINTENANCE LETTER (OUTPATIENT)
Age: 47
End: 2022-11-20

## 2023-06-01 ENCOUNTER — HEALTH MAINTENANCE LETTER (OUTPATIENT)
Age: 48
End: 2023-06-01

## 2024-06-16 ENCOUNTER — HEALTH MAINTENANCE LETTER (OUTPATIENT)
Age: 49
End: 2024-06-16